# Patient Record
Sex: MALE | Race: ASIAN | NOT HISPANIC OR LATINO | Employment: FULL TIME | URBAN - METROPOLITAN AREA
[De-identification: names, ages, dates, MRNs, and addresses within clinical notes are randomized per-mention and may not be internally consistent; named-entity substitution may affect disease eponyms.]

---

## 2019-06-10 ENCOUNTER — OFFICE VISIT (OUTPATIENT)
Dept: FAMILY MEDICINE | Facility: CLINIC | Age: 29
End: 2019-06-10
Payer: COMMERCIAL

## 2019-06-10 VITALS
OXYGEN SATURATION: 98 % | DIASTOLIC BLOOD PRESSURE: 64 MMHG | BODY MASS INDEX: 26.14 KG/M2 | WEIGHT: 182.6 LBS | TEMPERATURE: 98.5 F | HEART RATE: 52 BPM | SYSTOLIC BLOOD PRESSURE: 112 MMHG | HEIGHT: 70 IN

## 2019-06-10 DIAGNOSIS — Z13.1 SCREENING FOR DIABETES MELLITUS: ICD-10-CM

## 2019-06-10 DIAGNOSIS — Z00.00 ROUTINE PHYSICAL EXAMINATION: Primary | ICD-10-CM

## 2019-06-10 DIAGNOSIS — Z23 NEED FOR VACCINATION: ICD-10-CM

## 2019-06-10 DIAGNOSIS — Z13.220 LIPID SCREENING: ICD-10-CM

## 2019-06-10 PROCEDURE — 99385 PREV VISIT NEW AGE 18-39: CPT | Mod: 25 | Performed by: FAMILY MEDICINE

## 2019-06-10 PROCEDURE — 90471 IMMUNIZATION ADMIN: CPT | Performed by: FAMILY MEDICINE

## 2019-06-10 PROCEDURE — 90715 TDAP VACCINE 7 YRS/> IM: CPT | Performed by: FAMILY MEDICINE

## 2019-06-10 SDOH — HEALTH STABILITY: MENTAL HEALTH: HOW OFTEN DO YOU HAVE 6 OR MORE DRINKS ON ONE OCCASION?: LESS THAN MONTHLY

## 2019-06-10 SDOH — HEALTH STABILITY: MENTAL HEALTH: HOW OFTEN DO YOU HAVE A DRINK CONTAINING ALCOHOL?: MONTHLY OR LESS

## 2019-06-10 SDOH — HEALTH STABILITY: MENTAL HEALTH: HOW MANY STANDARD DRINKS CONTAINING ALCOHOL DO YOU HAVE ON A TYPICAL DAY?: 1 OR 2

## 2019-06-10 ASSESSMENT — MIFFLIN-ST. JEOR: SCORE: 1791.58

## 2019-06-10 NOTE — PROGRESS NOTES
SUBJECTIVE:   CC: Lew Benitez is an 28 year old male who presents for preventative health visit.     Healthy Habits:    Getting at least 3 servings of Calcium per day:  NO    Bi-annual eye exam:  Yes    Dental care twice a year:  Yes    Sleep apnea or symptoms of sleep apnea:  None    Diet:  Regular (no restrictions)    Frequency of exercise:  None    Duration of exercise:  N/A    Taking medications regularly:  Not Applicable    Barriers to taking medications:  Not applicable    Medication side effects:  Not applicable    PHQ-2 Total Score:    Additional concerns today:  No    Today's PHQ-2 Score:   PHQ-2 ( 1999 Pfizer) 6/10/2019   Q1: Little interest or pleasure in doing things 0   Q2: Feeling down, depressed or hopeless 0   PHQ-2 Score 0       Abuse: Current or Past(Physical, Sexual or Emotional)- No  Do you feel safe in your environment? Yes    History reviewed. No pertinent past medical history.    Past Surgical History:   Procedure Laterality Date     APPENDECTOMY         History reviewed. No pertinent family history.    Social History     Tobacco Use     Smoking status: Never Smoker     Smokeless tobacco: Never Used   Substance Use Topics     Alcohol use: Yes     Frequency: Monthly or less     Drinks per session: 1 or 2     Binge frequency: Less than monthly     Comment: once a month     Social History     Tobacco Use     Smoking status: Never Smoker     Smokeless tobacco: Never Used   Substance Use Topics     Alcohol use: Yes     Frequency: Monthly or less     Drinks per session: 1 or 2     Binge frequency: Less than monthly         No flowsheet data found.    Last PSA: No results found for: PSA    Reviewed orders with patient. Reviewed health maintenance and updated orders accordingly - Yes      Reviewed and updated as needed this visit by clinical staff  Tobacco  Allergies  Meds  Med Hx  Surg Hx  Fam Hx  Soc Hx        Reviewed and updated as needed this visit by Provider        1. Establish care    2.  "Physical exam: Otherwise, patient has no complaints.  Patient moved from [place] 2 years ago for work and wife is in undergraduate school.     Review of Systems  CONSTITUTIONAL: NEGATIVE for fever, chills, change in weight  INTEGUMENTARY/SKIN: NEGATIVE for worrisome rashes, moles or lesions  EYES: NEGATIVE for vision changes or irritation  ENT: NEGATIVE for ear, mouth and throat problems  RESP: NEGATIVE for significant cough or SOB  CV: NEGATIVE for chest pain, palpitations or peripheral edema  GI: NEGATIVE for nausea, abdominal pain, heartburn, or change in bowel habits   male: negative for dysuria, hematuria, decreased urinary stream, erectile dysfunction, urethral discharge  MUSCULOSKELETAL: NEGATIVE for significant arthralgias or myalgia  NEURO: NEGATIVE for weakness, dizziness or paresthesias  PSYCHIATRIC: NEGATIVE for changes in mood or affect    OBJECTIVE:   /64 (BP Location: Left arm, Cuff Size: Adult Regular)   Pulse 52   Temp 98.5  F (36.9  C) (Tympanic)   Ht 1.765 m (5' 9.5\")   Wt 82.8 kg (182 lb 9.6 oz)   SpO2 98%   BMI 26.58 kg/m      Physical Exam  GENERAL: healthy, alert and no distress  EYES: Eyes grossly normal to inspection, PERRL and conjunctivae and sclerae normal  HENT: ear canals and TM's normal, nose and mouth without ulcers or lesions  NECK: no adenopathy, no asymmetry, masses, or scars and thyroid normal to palpation  RESP: lungs clear to auscultation - no rales, rhonchi or wheezes  CV: regular rate and rhythm, normal S1 S2, no S3 or S4, no murmur, click or rub, no peripheral edema and peripheral pulses strong  ABDOMEN: soft, nontender, no hepatosplenomegaly, no masses and bowel sounds normal  MS: no gross musculoskeletal defects noted, no edema  SKIN: no suspicious lesions or rashes  NEURO: Normal strength and tone, mentation intact and speech normal  PSYCH: mentation appears normal, affect normal/bright    ASSESSMENT/PLAN:     1. Routine physical examination    2. Need for " "vaccination  - TDAP VACCINE (ADACEL)  - ADMIN 1st VACCINE    3. Lipid screening  - Lipid panel reflex to direct LDL Fasting; Future    4. Screening for diabetes mellitus  - **Glucose FUTURE anytime; Future    COUNSELING:   Reviewed preventive health counseling, as reflected in patient instructions       Regular exercise       Healthy diet/nutrition    Estimated body mass index is 26.58 kg/m  as calculated from the following:    Height as of this encounter: 1.765 m (5' 9.5\").    Weight as of this encounter: 82.8 kg (182 lb 9.6 oz).     Weight management plan: Discussed healthy diet and exercise guidelines     reports that he has never smoked. He has never used smokeless tobacco.      Counseling Resources:  ATP IV Guidelines  Pooled Cohorts Equation Calculator  FRAX Risk Assessment  ICSI Preventive Guidelines  Dietary Guidelines for Americans, 2010  USDA's MyPlate  ASA Prophylaxis  Lung CA Screening    Ed Rodriguez, DO  [facility]    "

## 2019-06-10 NOTE — PATIENT INSTRUCTIONS
Gremohans Lew Benitez,    Thank you for allowing [facility] to manage your care.    Encourage 2 hours per week of cardiovascular activities (cycling, swimming, jogging, elliptical, or treadmill)    I ordered some fasting blood work, please go to the laboratory to get your laboratory studies.  Please call [phone] to scheduled your laboratory appointment.     Please allow 1-2 business days for our office to call you in regards to your laboratory/radiological studies.  If not done so, I encourage you to login into [employer] ([email or web address] to review your results as well.     If you have any questions or concerns, please feel free to call us at [phone].    Sincerely,    Dr. Rodriguez

## 2019-06-11 ENCOUNTER — TELEPHONE (OUTPATIENT)
Dept: FAMILY MEDICINE | Facility: CLINIC | Age: 29
End: 2019-06-11

## 2019-06-11 DIAGNOSIS — Z13.1 SCREENING FOR DIABETES MELLITUS: ICD-10-CM

## 2019-06-11 DIAGNOSIS — R73.03 BORDERLINE DIABETES: ICD-10-CM

## 2019-06-11 DIAGNOSIS — Z13.220 LIPID SCREENING: ICD-10-CM

## 2019-06-11 DIAGNOSIS — E78.1 HYPERTRIGLYCERIDEMIA: Primary | ICD-10-CM

## 2019-06-11 LAB
CHOLEST SERPL-MCNC: 160 MG/DL
GLUCOSE SERPL-MCNC: 151 MG/DL (ref 70–99)
HDLC SERPL-MCNC: 30 MG/DL
LDLC SERPL CALC-MCNC: ABNORMAL MG/DL
LDLC SERPL DIRECT ASSAY-MCNC: 82 MG/DL
NONHDLC SERPL-MCNC: 130 MG/DL
TRIGL SERPL-MCNC: 1776 MG/DL

## 2019-06-11 PROCEDURE — 36415 COLL VENOUS BLD VENIPUNCTURE: CPT | Performed by: FAMILY MEDICINE

## 2019-06-11 PROCEDURE — 80061 LIPID PANEL: CPT | Performed by: FAMILY MEDICINE

## 2019-06-11 PROCEDURE — 83721 ASSAY OF BLOOD LIPOPROTEIN: CPT | Performed by: FAMILY MEDICINE

## 2019-06-11 PROCEDURE — 82947 ASSAY GLUCOSE BLOOD QUANT: CPT | Performed by: FAMILY MEDICINE

## 2019-06-11 RX ORDER — FENOFIBRATE 145 MG/1
145 TABLET, COATED ORAL DAILY
Qty: 90 TABLET | Refills: 3 | Status: SHIPPED | OUTPATIENT
Start: 2019-06-11 | End: 2019-06-24

## 2019-06-11 NOTE — TELEPHONE ENCOUNTER
Recent cholesterol screen is abnormal.  Patient's triglycerides are extremely elevated.  Please confirm that he was fasting.  If yes, rx for fenofibrate  (please go over medication instruction with patient).  Recheck fasting labs in 1 month.  Advise to call [phone] to scheduled your laboratory appointment.     1. Hypertriglyceridemia  - fenofibrate (TRICOR) 145 MG tablet; Take 1 tablet (145 mg) by mouth daily  Dispense: 90 tablet; Refill: 3  - Lipid panel reflex to direct LDL Fasting; Future

## 2019-06-11 NOTE — TELEPHONE ENCOUNTER
Please see my below as well.  Also, his blood sugars are borderline elevated and concerning for diabetes.  Advise to decrease carbohydrate intake (bread, potato, pasta, and sweets) and encourage 2 hours per week of cardiovascular activities (cycling, swimming, jogging, elliptical, or treadmill).  Recheck fasting labs in 1 month.     1. Hypertriglyceridemia  - fenofibrate (TRICOR) 145 MG tablet; Take 1 tablet (145 mg) by mouth daily  Dispense: 90 tablet; Refill: 3  - Lipid panel reflex to direct LDL Fasting; Future    2. Borderline diabetes  - Hemoglobin A1c; Future

## 2019-06-12 ENCOUNTER — MYC MEDICAL ADVICE (OUTPATIENT)
Dept: FAMILY MEDICINE | Facility: CLINIC | Age: 29
End: 2019-06-12

## 2019-06-12 NOTE — TELEPHONE ENCOUNTER
Patient called and labs explained. He said he ate at a buffet the night before the lipid tests and had a lot of sweet things. He said he retested this at work and the triglycerides were much lower. I asked him to fax these results to us or attach them in a My Chart note. He will  the medication and recheck in 1 month.  Paige Santana RN

## 2019-06-17 ENCOUNTER — OFFICE VISIT (OUTPATIENT)
Dept: FAMILY MEDICINE | Facility: CLINIC | Age: 29
End: 2019-06-17
Payer: COMMERCIAL

## 2019-06-17 VITALS
DIASTOLIC BLOOD PRESSURE: 64 MMHG | SYSTOLIC BLOOD PRESSURE: 110 MMHG | BODY MASS INDEX: 26.05 KG/M2 | HEIGHT: 70 IN | HEART RATE: 56 BPM | TEMPERATURE: 98.5 F | OXYGEN SATURATION: 99 % | WEIGHT: 182 LBS | RESPIRATION RATE: 16 BRPM

## 2019-06-17 DIAGNOSIS — R73.09 ELEVATED GLUCOSE: Primary | ICD-10-CM

## 2019-06-17 DIAGNOSIS — H91.93 BILATERAL HEARING LOSS, UNSPECIFIED HEARING LOSS TYPE: ICD-10-CM

## 2019-06-17 PROCEDURE — 99213 OFFICE O/P EST LOW 20 MIN: CPT | Performed by: PHYSICIAN ASSISTANT

## 2019-06-17 ASSESSMENT — MIFFLIN-ST. JEOR: SCORE: 1788.86

## 2019-06-17 NOTE — PATIENT INSTRUCTIONS
Come in fasting (no eating for 9 hours), appointment scheduled today.   Please notify me if you get a bill for the cholesterol panel and we will take that off.  You will get a bill for the HgA1c and the repeat glucose.

## 2019-06-17 NOTE — PROGRESS NOTES
Subjective     Lew Benitez is a 28 year old male who presents to clinic today for the following health issues:    HPI   * Patient requesting ear lavage. Slightly decreased hearing xmonths. Denies: pain or drainage from ears.  He has been evaluated for hearing loss in the past and found to have decreased hearing due to noise injury.  He was told to wear protective headphones if in loud areas.  He states he mainly has difficulty with high pitched noises, no issues with conversation or day to day speech.     *Discuss lab results- see notes from Dr. Rodriguez- needs labs repeated.  He states he ate at a buffet the night before.  He had testing done through work for a biophysical visit and handwritten numbers were much better compared to the lab results we just recently got.      Paz Walker [facility]        Patient Active Problem List   Diagnosis     Hypertriglyceridemia     Elevated glucose     Bilateral hearing loss, unspecified hearing loss type     Past Surgical History:   Procedure Laterality Date     APPENDECTOMY         Social History     Tobacco Use     Smoking status: Never Smoker     Smokeless tobacco: Never Used   Substance Use Topics     Alcohol use: Yes     Frequency: Monthly or less     Drinks per session: 1 or 2     Binge frequency: Less than monthly     Comment: once a month     History reviewed. No pertinent family history.      Current Outpatient Medications   Medication Sig Dispense Refill     fenofibrate (TRICOR) 145 MG tablet Take 1 tablet (145 mg) by mouth daily 90 tablet 3     BP Readings from Last 3 Encounters:   06/17/19 110/64   06/10/19 112/64    Wt Readings from Last 3 Encounters:   06/17/19 82.6 kg (182 lb)   06/10/19 82.8 kg (182 lb 9.6 oz)                      Reviewed and updated as needed this visit by Provider  Tobacco  Allergies  Meds  Problems  Med Hx  Surg Hx  Fam Hx         Review of Systems   ROS COMP: Constitutional, HEENT, cardiovascular, pulmonary, gi and gu systems are negative,  "except as otherwise noted.      Objective    /64   Pulse 56   Temp 98.5  F (36.9  C) (Tympanic)   Resp 16   Ht 1.765 m (5' 9.5\")   Wt 82.6 kg (182 lb)   SpO2 99%   BMI 26.49 kg/m    Body mass index is 26.49 kg/m .  Physical Exam   GENERAL: healthy, alert and no distress  HENT: ear canals and TM's normal, nose and mouth without ulcers or lesions    Diagnostic Test Results:  Labs reviewed in Epic  Results for orders placed or performed in visit on 06/11/19   **Glucose FUTURE anytime   Result Value Ref Range    Glucose 151 (H) 70 - 99 mg/dL   Lipid panel reflex to direct LDL Fasting   Result Value Ref Range    Cholesterol 160 <200 mg/dL    Triglycerides 1,776 (H) <150 mg/dL    HDL Cholesterol 30 (L) >39 mg/dL    LDL Cholesterol Calculated  <100 mg/dL     Cannot estimate LDL when triglyceride exceeds 400 mg/dL    Non HDL Cholesterol 130 (H) <130 mg/dL   LDL cholesterol direct   Result Value Ref Range    LDL Cholesterol Direct 82 <100 mg/dL           Assessment & Plan     1. Elevated glucose  Reviewed biometric forms and outside labs that were done (which were hand written in so I cannot use those to complete current forms).  Triglycerides are extremely elevated and I question validity of this test.  Will repeat fasting lipid panel in addition to diabetes work-up (repeat fasting glucose and HgA1C).  He does have a family history of diabetes and was told his sugar was high in the past.   - Glucose; Future    Bilateral hearing loss.  Has been diagnosed in the past.  No ear wax noted during exam today.  I offered referral to audiology, however he states he has already been through that work up at another clinic.      BMI:   Estimated body mass index is 26.49 kg/m  as calculated from the following:    Height as of this encounter: 1.765 m (5' 9.5\").    Weight as of this encounter: 82.6 kg (182 lb).         Please notify me if you get a bill for the cholesterol panel and we will take that off.  You will get a bill " for the HgA1c and the repeat glucose.        Return in about 1 week (around 6/24/2019) for fasting labs.    Estrellita Johnson PA-C  [facility]

## 2019-06-20 PROBLEM — H91.93 BILATERAL HEARING LOSS, UNSPECIFIED HEARING LOSS TYPE: Status: ACTIVE | Noted: 2019-06-20

## 2019-06-20 PROBLEM — R73.09 ELEVATED GLUCOSE: Status: ACTIVE | Noted: 2019-06-20

## 2019-06-24 DIAGNOSIS — R73.09 ELEVATED GLUCOSE: ICD-10-CM

## 2019-06-24 DIAGNOSIS — E78.1 HYPERTRIGLYCERIDEMIA: ICD-10-CM

## 2019-06-24 DIAGNOSIS — R73.03 BORDERLINE DIABETES: ICD-10-CM

## 2019-06-24 LAB
CHOLEST SERPL-MCNC: 154 MG/DL
GLUCOSE SERPL-MCNC: 110 MG/DL (ref 70–99)
HBA1C MFR BLD: 5.6 % (ref 0–5.6)
HDLC SERPL-MCNC: 35 MG/DL
LDLC SERPL CALC-MCNC: 47 MG/DL
NONHDLC SERPL-MCNC: 119 MG/DL
TRIGL SERPL-MCNC: 359 MG/DL

## 2019-06-24 PROCEDURE — 82947 ASSAY GLUCOSE BLOOD QUANT: CPT | Performed by: PHYSICIAN ASSISTANT

## 2019-06-24 PROCEDURE — 83036 HEMOGLOBIN GLYCOSYLATED A1C: CPT | Performed by: PHYSICIAN ASSISTANT

## 2020-03-11 ENCOUNTER — HEALTH MAINTENANCE LETTER (OUTPATIENT)
Age: 30
End: 2020-03-11

## 2021-01-03 ENCOUNTER — HEALTH MAINTENANCE LETTER (OUTPATIENT)
Age: 31
End: 2021-01-03

## 2021-10-10 ENCOUNTER — HEALTH MAINTENANCE LETTER (OUTPATIENT)
Age: 31
End: 2021-10-10

## 2021-12-08 ENCOUNTER — TRANSFERRED RECORDS (OUTPATIENT)
Dept: PHYSICAL THERAPY | Facility: CLINIC | Age: 31
End: 2021-12-08
Payer: COMMERCIAL

## 2022-01-30 ENCOUNTER — HEALTH MAINTENANCE LETTER (OUTPATIENT)
Age: 32
End: 2022-01-30

## 2022-09-18 ENCOUNTER — HEALTH MAINTENANCE LETTER (OUTPATIENT)
Age: 32
End: 2022-09-18

## 2023-05-07 ENCOUNTER — HEALTH MAINTENANCE LETTER (OUTPATIENT)
Age: 33
End: 2023-05-07

## 2023-09-20 ASSESSMENT — ENCOUNTER SYMPTOMS
SHORTNESS OF BREATH: 1
CONSTIPATION: 0
DYSURIA: 0
HEMATOCHEZIA: 0
FEVER: 0
HEADACHES: 0
ABDOMINAL PAIN: 0
EYE PAIN: 0
WEAKNESS: 1
JOINT SWELLING: 1
NERVOUS/ANXIOUS: 0
SORE THROAT: 0
CHILLS: 0
DIARRHEA: 0
ARTHRALGIAS: 1
PARESTHESIAS: 0
DIZZINESS: 1
HEMATURIA: 0
PALPITATIONS: 0
FREQUENCY: 0
MYALGIAS: 0
NAUSEA: 0
COUGH: 0
HEARTBURN: 0

## 2023-09-27 ENCOUNTER — ANCILLARY PROCEDURE (OUTPATIENT)
Dept: GENERAL RADIOLOGY | Facility: CLINIC | Age: 33
End: 2023-09-27
Attending: INTERNAL MEDICINE
Payer: COMMERCIAL

## 2023-09-27 ENCOUNTER — OFFICE VISIT (OUTPATIENT)
Dept: FAMILY MEDICINE | Facility: CLINIC | Age: 33
End: 2023-09-27
Payer: COMMERCIAL

## 2023-09-27 VITALS
TEMPERATURE: 97.9 F | DIASTOLIC BLOOD PRESSURE: 74 MMHG | BODY MASS INDEX: 26.37 KG/M2 | SYSTOLIC BLOOD PRESSURE: 113 MMHG | HEART RATE: 73 BPM | OXYGEN SATURATION: 98 % | HEIGHT: 70 IN | WEIGHT: 184.2 LBS | RESPIRATION RATE: 14 BRPM

## 2023-09-27 DIAGNOSIS — Z01.83 BLOOD TYPING ENCOUNTER: ICD-10-CM

## 2023-09-27 DIAGNOSIS — Z00.00 ROUTINE GENERAL MEDICAL EXAMINATION AT A HEALTH CARE FACILITY: Primary | ICD-10-CM

## 2023-09-27 DIAGNOSIS — Z13.228 SCREENING FOR METABOLIC DISORDER: ICD-10-CM

## 2023-09-27 DIAGNOSIS — R73.03 PREDIABETES: ICD-10-CM

## 2023-09-27 DIAGNOSIS — H90.3 BILATERAL SENSORINEURAL HEARING LOSS: ICD-10-CM

## 2023-09-27 DIAGNOSIS — M79.644 PAIN OF FINGER OF RIGHT HAND: ICD-10-CM

## 2023-09-27 DIAGNOSIS — Z13.0 SCREENING FOR BLOOD DISEASE: ICD-10-CM

## 2023-09-27 DIAGNOSIS — Z13.29 SCREENING FOR ENDOCRINE DISORDER: ICD-10-CM

## 2023-09-27 DIAGNOSIS — Z11.4 SCREENING FOR HIV (HUMAN IMMUNODEFICIENCY VIRUS): ICD-10-CM

## 2023-09-27 DIAGNOSIS — Z11.1 SCREENING EXAMINATION FOR PULMONARY TUBERCULOSIS: ICD-10-CM

## 2023-09-27 DIAGNOSIS — E78.1 HYPERTRIGLYCERIDEMIA: ICD-10-CM

## 2023-09-27 DIAGNOSIS — M53.3 SACROILIAC PAIN: ICD-10-CM

## 2023-09-27 DIAGNOSIS — Z11.59 NEED FOR HEPATITIS C SCREENING TEST: ICD-10-CM

## 2023-09-27 LAB
ABO/RH(D): NORMAL
ALBUMIN SERPL BCG-MCNC: 4.7 G/DL (ref 3.5–5.2)
ALP SERPL-CCNC: 68 U/L (ref 40–129)
ALT SERPL W P-5'-P-CCNC: 14 U/L (ref 0–70)
ANION GAP SERPL CALCULATED.3IONS-SCNC: 12 MMOL/L (ref 7–15)
AST SERPL W P-5'-P-CCNC: 20 U/L (ref 0–45)
BILIRUB SERPL-MCNC: 0.2 MG/DL
BUN SERPL-MCNC: 21.9 MG/DL (ref 6–20)
CALCIUM SERPL-MCNC: 9.4 MG/DL (ref 8.6–10)
CHLORIDE SERPL-SCNC: 104 MMOL/L (ref 98–107)
CHOLEST SERPL-MCNC: 185 MG/DL
CREAT SERPL-MCNC: 1 MG/DL (ref 0.67–1.17)
DEPRECATED HCO3 PLAS-SCNC: 24 MMOL/L (ref 22–29)
EGFRCR SERPLBLD CKD-EPI 2021: >90 ML/MIN/1.73M2
ERYTHROCYTE [DISTWIDTH] IN BLOOD BY AUTOMATED COUNT: 11.9 % (ref 10–15)
GLUCOSE SERPL-MCNC: 107 MG/DL (ref 70–99)
HCT VFR BLD AUTO: 45.2 % (ref 40–53)
HDLC SERPL-MCNC: 48 MG/DL
HGB BLD-MCNC: 14.9 G/DL (ref 13.3–17.7)
LDLC SERPL CALC-MCNC: 111 MG/DL
MCH RBC QN AUTO: 27.1 PG (ref 26.5–33)
MCHC RBC AUTO-ENTMCNC: 33 G/DL (ref 31.5–36.5)
MCV RBC AUTO: 82 FL (ref 78–100)
NONHDLC SERPL-MCNC: 137 MG/DL
PLATELET # BLD AUTO: 160 10E3/UL (ref 150–450)
POTASSIUM SERPL-SCNC: 4 MMOL/L (ref 3.4–5.3)
PROT SERPL-MCNC: 7.2 G/DL (ref 6.4–8.3)
RBC # BLD AUTO: 5.49 10E6/UL (ref 4.4–5.9)
SODIUM SERPL-SCNC: 140 MMOL/L (ref 135–145)
SPECIMEN EXPIRATION DATE: NORMAL
TRIGL SERPL-MCNC: 132 MG/DL
TSH SERPL DL<=0.005 MIU/L-ACNC: 3.75 UIU/ML (ref 0.3–4.2)
WBC # BLD AUTO: 5.7 10E3/UL (ref 4–11)

## 2023-09-27 PROCEDURE — 99214 OFFICE O/P EST MOD 30 MIN: CPT | Mod: 25 | Performed by: INTERNAL MEDICINE

## 2023-09-27 PROCEDURE — 86803 HEPATITIS C AB TEST: CPT | Performed by: INTERNAL MEDICINE

## 2023-09-27 PROCEDURE — 73140 X-RAY EXAM OF FINGER(S): CPT | Mod: TC | Performed by: RADIOLOGY

## 2023-09-27 PROCEDURE — 87389 HIV-1 AG W/HIV-1&-2 AB AG IA: CPT | Performed by: INTERNAL MEDICINE

## 2023-09-27 PROCEDURE — 99385 PREV VISIT NEW AGE 18-39: CPT | Performed by: INTERNAL MEDICINE

## 2023-09-27 PROCEDURE — 84443 ASSAY THYROID STIM HORMONE: CPT | Performed by: INTERNAL MEDICINE

## 2023-09-27 PROCEDURE — 80061 LIPID PANEL: CPT | Performed by: INTERNAL MEDICINE

## 2023-09-27 PROCEDURE — 72220 X-RAY EXAM SACRUM TAILBONE: CPT | Mod: TC | Performed by: STUDENT IN AN ORGANIZED HEALTH CARE EDUCATION/TRAINING PROGRAM

## 2023-09-27 PROCEDURE — 86901 BLOOD TYPING SEROLOGIC RH(D): CPT | Performed by: INTERNAL MEDICINE

## 2023-09-27 PROCEDURE — 36415 COLL VENOUS BLD VENIPUNCTURE: CPT | Performed by: INTERNAL MEDICINE

## 2023-09-27 PROCEDURE — 86900 BLOOD TYPING SEROLOGIC ABO: CPT | Performed by: INTERNAL MEDICINE

## 2023-09-27 PROCEDURE — 86481 TB AG RESPONSE T-CELL SUSP: CPT | Performed by: INTERNAL MEDICINE

## 2023-09-27 PROCEDURE — 85027 COMPLETE CBC AUTOMATED: CPT | Performed by: INTERNAL MEDICINE

## 2023-09-27 PROCEDURE — 80053 COMPREHEN METABOLIC PANEL: CPT | Performed by: INTERNAL MEDICINE

## 2023-09-27 ASSESSMENT — ENCOUNTER SYMPTOMS
HEMATOCHEZIA: 0
EYE PAIN: 0
PALPITATIONS: 0
PARESTHESIAS: 0
FREQUENCY: 0
NERVOUS/ANXIOUS: 0
SORE THROAT: 0
ARTHRALGIAS: 1
HEADACHES: 0
HEMATURIA: 0
CHILLS: 0
COUGH: 0
WEAKNESS: 1
FEVER: 0
DIZZINESS: 1
DYSURIA: 0
NAUSEA: 0
JOINT SWELLING: 1
HEARTBURN: 0
MYALGIAS: 0
ABDOMINAL PAIN: 0
DIARRHEA: 0
CONSTIPATION: 0
SHORTNESS OF BREATH: 0

## 2023-09-27 ASSESSMENT — PAIN SCALES - GENERAL: PAINLEVEL: MILD PAIN (3)

## 2023-09-27 NOTE — PROGRESS NOTES
Tb goSUBJECTIVE:   CC: Lew is an 32 year old who presents for preventative health visit.       9/27/2023     7:49 AM   Additional Questions   Roomed by Livier   Accompanied by self         9/27/2023     7:49 AM   Patient Reported Additional Medications   Patient reports taking the following new medications none         New patient to our clinic.   History of mild to moderate hearing loss, cause unknown. Audiology 3-4 years ago. Found accidentally through a free hearing test.     Back pain for about 2 years. Initially he jumped off a rock about 4 feet high. When he landed felt the low back pain. Since then, has had almost daily back pain. He can always feel vague discomfort but frequently gets worse with activities, prolonged sitting, standing. Had xray through work health clinic in April 2022. It was negative. He was referred to PT but he didn't go.    Right index finger has pain at the PIP for a year. Injured it while walking the dog a year ago when the dog ran ahead and pulled on the leash.     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   Pt wanted to have complete blood count, thyroid test, blood type, liver enzymes checked. He is aware they are not standard preventive screening. Cost is not a concern to him. He and his wife are starting planning to have kids.     Healthy Habits:     Getting at least 3 servings of Calcium per day:  Yes    Bi-annual eye exam:  Yes    Dental care twice a year:  Yes    Sleep apnea or symptoms of sleep apnea:  Daytime drowsiness, Excessive snoring and Sleep apnea    Diet:  Regular (no restrictions)    Frequency of exercise:  2-3 days/week    Duration of exercise:  Less than 15 minutes    Taking medications regularly:  Yes    Medication side effects:  Not applicable    Additional concerns today:  No      Today's PHQ-2 Score:       9/26/2023     9:36 AM   PHQ-2 ( 1999 Pfizer)   Q1: Little interest or pleasure in doing things 0   Q2: Feeling down, depressed or  hopeless 0   PHQ-2 Score 0   Q1: Little interest or pleasure in doing things Not at all   Q2: Feeling down, depressed or hopeless Not at all   PHQ-2 Score 0       Have you ever done Advance Care Planning? (For example, a Health Directive, POLST, or a discussion with a medical provider or your loved ones about your wishes): Yes, advance care planning is on file.    Social History     Tobacco Use    Smoking status: Never    Smokeless tobacco: Never   Substance Use Topics    Alcohol use: Yes     Comment: once a month             9/20/2023     2:17 PM   Alcohol Use   Prescreen: >3 drinks/day or >7 drinks/week? No       Last PSA: No results found for: PSA    Reviewed orders with patient. Reviewed health maintenance and updated orders accordingly - Yes      Reviewed and updated as needed this visit by clinical staff   Tobacco  Allergies  Meds     [facility]          Reviewed and updated as needed this visit by Provider         [facility]             Review of Systems   Constitutional:  Negative for chills and fever.   HENT:  Positive for hearing loss. Negative for congestion, ear pain and sore throat.    Eyes:  Negative for pain and visual disturbance.   Respiratory:  Negative for cough and shortness of breath.    Cardiovascular:  Negative for chest pain, palpitations and peripheral edema.   Gastrointestinal:  Negative for abdominal pain, constipation, diarrhea, heartburn, hematochezia and nausea.   Genitourinary:  Negative for dysuria, frequency, genital sores, hematuria, impotence, penile discharge and urgency.   Musculoskeletal:  Positive for arthralgias and joint swelling. Negative for myalgias.        Back pain for 2 years. Worse with physical activity such as mowing the lawn, moving things, prolonged walking, standing or sitting   Skin:  Negative for rash.   Neurological:  Positive for dizziness (only when he stands up quickly from a squatting position. not at other times.) and weakness (back pain with physical  "activity such as mowing the law). Negative for headaches and paresthesias.   Psychiatric/Behavioral:  Negative for mood changes. The patient is not nervous/anxious.          OBJECTIVE:   /74 (BP Location: Right arm, Patient Position: Sitting, Cuff Size: Adult Regular)   Pulse 73   Temp 97.9  F (36.6  C) (Oral)   Resp 14   Ht 1.77 m (5' 9.69\")   Wt 83.6 kg (184 lb 3.2 oz)   SpO2 98%   BMI 26.67 kg/m      Physical Exam  GENERAL: healthy, alert and no distress  EYES: Eyes grossly normal to inspection, PERRL and conjunctivae and sclerae normal  HENT: ear canals and TM's normal, nose and mouth without ulcers or lesions  NECK: no adenopathy, no asymmetry, masses, or scars and thyroid normal to palpation  RESP: lungs clear to auscultation - no rales, rhonchi or wheezes  CV: regular rate and rhythm, normal S1 S2, no S3 or S4, no murmur, click or rub, no peripheral edema and peripheral pulses strong  ABDOMEN: soft, nontender, no hepatosplenomegaly, no masses and bowel sounds normal  MS: no gross musculoskeletal defects noted, no edema  SKIN: no suspicious lesions or rashes  NEURO: Normal strength and tone, mentation intact and speech normal  PSYCH: mentation appears normal, affect normal/bright    Right hand: tender at the PIP of the index finger. No significant bruising or swelling  Back: no lumbar spine tenderness. ROM normal. Pt pointed to SI joints bilaterally as the area of pain but currently nontender on palpation.     Diagnostic Test Results:  Results for orders placed or performed in visit on 09/27/23 (from the past 24 hour(s))   Quantiferon TB Gold Plus    Specimen: Peripheral Blood    Narrative    The following orders were created for panel order Quantiferon TB Gold Plus.  Procedure                               Abnormality         Status                     ---------                               -----------         ------                     Quantiferon TB Gold Plus...[[id number]]                      " In process                 Quantiferon TB Gold Plus...[[id number]]                      In process                 Quantiferon TB Gold Plus...[[id number]]                      In process                 Quantiferon TB Gold Plus...[[id number]]                      In process                   Please view results for these tests on the individual orders.   CBC with platelets   Result Value Ref Range    WBC Count 5.7 4.0 - 11.0 10e3/uL    RBC Count 5.49 4.40 - 5.90 10e6/uL    Hemoglobin 14.9 13.3 - 17.7 g/dL    Hematocrit 45.2 40.0 - 53.0 %    MCV 82 78 - 100 fL    MCH 27.1 26.5 - 33.0 pg    MCHC 33.0 31.5 - 36.5 g/dL    RDW 11.9 10.0 - 15.0 %    Platelet Count 160 150 - 450 10e3/uL       ASSESSMENT/PLAN:   Lew was seen today for physical.    Diagnoses and all orders for this visit:    Routine general medical examination at a health care facility  -     REVIEW OF HEALTH MAINTENANCE PROTOCOL ORDERS  -     HIV Antigen Antibody Combo; Future  -     Hepatitis C Screen Reflex to HCV RNA Quant and Genotype; Future    Screening for HIV (human immunodeficiency virus)  -     HIV Antigen Antibody Combo; Future  -     HIV Antigen Antibody Combo    Need for hepatitis C screening test  -     Hepatitis C Screen Reflex to HCV RNA Quant and Genotype; Future  -     Hepatitis C Screen Reflex to HCV RNA Quant and Genotype    Bilateral sensorineural hearing loss  -     Adult ENT  Referral; Future    Hypertriglyceridemia  -     Lipid panel reflex to direct LDL Fasting; Future  -     Lipid panel reflex to direct LDL Fasting    Prediabetes  -     Cancel: Glucose; Future    Screening for endocrine disorder  -     TSH with free T4 reflex; Future  -     TSH with free T4 reflex    Sacroiliac pain  -     XR Sacrum and Coccyx 2 Views; Future    Blood typing encounter  -     ABO and Rh; Future  -     ABO and Rh    Pain of finger of right hand  -     XR Finger Right G/E 2 Views; Future    Screening for metabolic disorder  -     Cancel:  Hepatic panel (Albumin, ALT, AST, Bili, Alk Phos, TP); Future  -     Comprehensive metabolic panel (BMP + Alb, Alk Phos, ALT, AST, Total. Bili, TP); Future  -     Comprehensive metabolic panel (BMP + Alb, Alk Phos, ALT, AST, Total. Bili, TP)    Screening for blood disease  -     CBC with platelets; Future  -     CBC with platelets    Screening examination for pulmonary tuberculosis  -     Quantiferon TB Gold Plus; Future  -     Quantiferon TB Gold Plus      Pt reported having had hepatitis B vaccine in [place].   Declined vaccines today.       COUNSELING:   Reviewed preventive health counseling, as reflected in patient instructions    He reports that he has never smoked. He has never used smokeless tobacco.        Bret Saldivar MD PhD  [facility]

## 2023-09-28 LAB
GAMMA INTERFERON BACKGROUND BLD IA-ACNC: 0.12 IU/ML
HCV AB SERPL QL IA: NONREACTIVE
HIV 1+2 AB+HIV1 P24 AG SERPL QL IA: NONREACTIVE
M TB IFN-G BLD-IMP: NEGATIVE
M TB IFN-G CD4+ BCKGRND COR BLD-ACNC: 9.88 IU/ML
MITOGEN IGNF BCKGRD COR BLD-ACNC: -0.02 IU/ML
MITOGEN IGNF BCKGRD COR BLD-ACNC: 0.01 IU/ML
QUANTIFERON MITOGEN: 10 IU/ML
QUANTIFERON NIL TUBE: 0.12 IU/ML
QUANTIFERON TB1 TUBE: 0.13 IU/ML
QUANTIFERON TB2 TUBE: 0.1

## 2023-09-29 ENCOUNTER — MYC MEDICAL ADVICE (OUTPATIENT)
Dept: FAMILY MEDICINE | Facility: CLINIC | Age: 33
End: 2023-09-29
Payer: COMMERCIAL

## 2023-09-29 DIAGNOSIS — M79.644 PAIN OF FINGER OF RIGHT HAND: Primary | ICD-10-CM

## 2023-10-09 ENCOUNTER — OFFICE VISIT (OUTPATIENT)
Dept: PHYSICAL MEDICINE AND REHAB | Facility: CLINIC | Age: 33
End: 2023-10-09
Payer: COMMERCIAL

## 2023-10-09 VITALS
DIASTOLIC BLOOD PRESSURE: 69 MMHG | BODY MASS INDEX: 27.36 KG/M2 | WEIGHT: 189 LBS | HEART RATE: 73 BPM | SYSTOLIC BLOOD PRESSURE: 104 MMHG

## 2023-10-09 DIAGNOSIS — M54.50 CHRONIC BILATERAL LOW BACK PAIN WITHOUT SCIATICA: Primary | ICD-10-CM

## 2023-10-09 DIAGNOSIS — G89.29 CHRONIC BILATERAL LOW BACK PAIN WITHOUT SCIATICA: Primary | ICD-10-CM

## 2023-10-09 DIAGNOSIS — M62.838 MUSCLE SPASM: ICD-10-CM

## 2023-10-09 PROCEDURE — 99204 OFFICE O/P NEW MOD 45 MIN: CPT | Performed by: NURSE PRACTITIONER

## 2023-10-09 NOTE — PATIENT INSTRUCTIONS
It was a pleasure seeing you in the clinic today.  As discussed, we made the following updates to your plan of care:       An order for physical therapy was added today. Physical therapy schedulers should call you in the next few days to schedule an appointment. You may also call [phone] to arrange an appointment at any of the [facility] outpatient physical therapy locations.  It will be very important for you to do the physical therapy exercises on a regular basis to decrease your pain and prevent future flares of pain.     Let's follow up after 6 weeks of physical therapy and home exercise program to see how you are doing and talk about next steps.     Thank you,  Ruthie Moctezuma NP  Physical Medicine and Rehabilitation, Medical Spine  PM&R clinic Phone: [phone]  PM[facility]R clinic Fax: [phone]

## 2023-10-09 NOTE — TELEPHONE ENCOUNTER
FUTURE VISIT INFORMATION      FUTURE VISIT INFORMATION:  Date: 12.6.23  Time: 8:20AM  Location: [facility]  REFERRAL INFORMATION:  Referring provider:  Bret Saldivar MD PhD   Referring providers clinic:  [facility]   Reason for visit/diagnosis  per patient hearing loss     RECORDS REQUESTED FROM:       Clinic name Comments Records Status Imaging Status   [facility] 9/27/23- OV . Bret Saldivar MD PhD  [facility]

## 2023-10-09 NOTE — PROGRESS NOTES
Patient seen at the request of No ref. provider found for an opinion and evaluation of low back pain.      HISTORY OF PRESENT ILLNESS:  Lew Benitez is a 32 year old male who presents with a chief complaint of chronic low back pain.     He was seen today in the clinic.  He reports chronic low back pain that started about 2 years ago.  He was traveling and took a photo, then jumped off a rock about 4 feet high.  When he landed, he had low back pain and had difficulty bending.  As he traveled back home, the pain somewhat improved.    However, since that time he has experienced persistent, intermittent, localized, nonradiating, bilateral low back pain most days. Certain positions seem to aggravate the pain.  In particular, lumbar flexion bothers him.  For example, if he is bending forward to wash his face for a few minutes, his low back starts to hurt.  Sitting, lifting, or prolonged walking also bring on the pain.  He says the pain affects his ability to stand up straight.  Generally, the pain happens transiently / with certain positions.  He did have a flare of pain last year which lasted a full day, that started after he woke up and just turned off his alarm.      He cannot identify any particular relieving factors.  He does not take any medication for pain.  Currently, while sitting he is not having any pain. His sleep has not been affected.    He was seen through a work health clinic previously. Imaging has included x-ray of the lumbar spine and sacrum/coccyx which were unremarkable.      He denies falls, weakness, bowel or bladder incontinence, saddle anesthesia, unintentional weight loss, fevers/chills, or night sweats.       PRIOR INJURIES/TREATMENT:   Ice/Heat: none    Brace: none    Physical Therapy:   none     - Current Pain Medications -   no    - Prior/Trialed Pain Medications -   none    Prior Procedures:  Date    Procedure   Improvement (%)  none              Prior Related Surgery: none   Other  (acupuncture, OMT, CMM, TENS, DME, etc.): none    Specialists Seen - (with most recent, available notes and clinic visits reviewed)   1. none       IMAGING - reviewed   XR SACRUM AND COCCYX 2 VIEWS 9/27/2023   IMPRESSION:    1.  No acute fracture or malalignment.  2.  No significant degenerative changes or erosive change involving  the sacroiliac joints.      PLAIN RADIOGRAPHS LUMBAR SPINE 2 VIEWS 3/3/2022  INTERPRETATION: There are 5 nonrib-bearing lumbar-type vertebral bodies. Normal lordotic curvature. No loss of vertebral body height. No loss of disc space height. No spondylolisthesis.    CONCLUSION: Unremarkable plain radiographs of the lumbar spine.        Review Of Systems:  I am responding to those symptoms which are directly relevant to the specific indication for my consultation. I recommend that the patient follow up with their primary or referring provider to pursue any other symptoms which may be of concern.       Medical History:  He has no past medical history on file.     He has a past surgical history that includes appendectomy.      Family History  His family history includes Diabetes in his maternal grandfather; No Known Problems in his father and mother; Prostate Cancer (age of onset: 70 - 79) in his maternal grandfather.     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 Performs ADLs/IADLs independently.      He  reports that he has never smoked. He has never used smokeless tobacco. He reports current alcohol use. He reports that he does not use drugs.        Current Medications:   He currently has no medications in their medication list.     Allergies:   No Known Allergies    PHYSICAL EXAMINATION:  /69   Pulse 73   Wt 85.7 kg (189 lb)   BMI 27.36 kg/m     --CONSTITUTIONAL: Vital signs as above. No acute distress. The patient is well nourished and well groomed.  --PSYCHIATRIC: The patient is awake,  alert, oriented to person, place, time and answering questions appropriately with clear speech. Appropriate mood and affect   --SKIN:  Posterior torso is clean, dry, intact without rashes.   --RESPIRATORY: Normal rhythm and effort. No abnormal accessory muscle breathing patterns noted.   --GROSS MOTOR: Easily arises from a seated position. Toe walking and heel walking are normal.    --STANDING EXAMINATION: Gait is non-antalgic. Normal lumbar lordosis noted, no lateral shift.  --LOWER EXTREMITY MOTOR TESTING:  Plantar flexion left 5/5, right 5/5   Dorsiflexion left 5/5, right 5/5   Great toe MTP extension left 5/5, right 5/5  Knee flexion left 5/5, right 5/5  Knee extension left 5/5, right 5/5   Hip flexion left 5/5, right 5/5  --MUSCULOSKELETAL: Lumbar spine inspection reveals no evidence of deformity. Range of motion is not limited in lumbar flexion, extension, lateral rotation.  Lumbar flexion does not elicit pain on exam no tenderness to palpation lumbar spine. Straight leg raise negative bilaterally. Sciatic notch non-tender. Facet loading maneuvers are positive on the left side, and negative on the right side.  Muscle fullness lumbar paraspinals. Localized pain around L5, S1 on the right side elicits mild pain  --SACROILIAC JOINT: Olvin positive on the right side.   Seng's negative bilaterally. Gaenslen's negative bilaterally.  Sacroiliac Joint Compression Test negative bilaterally.   --HIPS: Full range of motion bilaterally. Negative FABERs on the involved lower extremity. No pain with logrolling. No pain with palpation of the greater trochanter.   --NEUROLOGIC: 2/4 patellar and achilles reflexes bilaterally.  Sensation to light touch is intact in the bilateral L4, L5, and S1 dermatomes.  No clonus.    --VASCULAR:  Warm lower limbs bilaterally. There is no edema of the bilateral lower extremities.       ASSESSMENT:  Lew Benitez is a pleasant 32 year old male who presents with chronic, intermittent, bilateral,  nonradiating low back pain which started after jumping off a rock 2 years ago.  This pain is mostly positional, aggravated with lumbar flexion in particular.  Suspect pain may be myofascial, possibly with a component of facet mediated pain.      PLAN:  -X-ray x-ray of the lumbar spine 3/3/2022 and x-ray of the sacrum/coccyx 9/27/2023 were unremarkable.  Discussed possibly updating the x-ray of the lumbar spine, however, given that his symptoms are unchanged, he prefers to hold off for now.  -Add referral for physical therapy.  We discussed the importance of a home exercise program.  -RTC after 6 weeks of PT and home exercise program.  If his symptoms are unchanged at that time, will add MRI of the lumbar spine.    Ready to learn, no apparent learning barriers.  Education provided on treatment plan according to patient's preferred learning style.  Patient verbalizes understanding.   __________________________________  Ruthie Moctezuma NP  Physical Medicine & Rehabilitation        45 minutes spent by me on the date of the encounter doing chart review, history and exam, documentation and further activities per the note

## 2023-10-09 NOTE — LETTER
10/9/2023       RE: Lew Benitez  [address] [place] [address]     Dear Colleague,    Thank you for referring your patient, Lew Benitez, to the [facility] [place] at [facility]. Please see a copy of my visit note below.    Patient seen at the request of No ref. provider found for an opinion and evaluation of low back pain.      HISTORY OF PRESENT ILLNESS:  Lew Benitez is a 32 year old male who presents with a chief complaint of chronic low back pain.     He was seen today in the clinic.  He reports chronic low back pain that started about 2 years ago.  He was traveling and took a photo, then jumped off a rock about 4 feet high.  When he landed, he had low back pain and had difficulty bending.  As he traveled back home, the pain somewhat improved.    However, since that time he has experienced persistent, intermittent, localized, nonradiating, bilateral low back pain most days. Certain positions seem to aggravate the pain.  In particular, lumbar flexion bothers him.  For example, if he is bending forward to wash his face for a few minutes, his low back starts to hurt.  Sitting, lifting, or prolonged walking also bring on the pain.  He says the pain affects his ability to stand up straight.  Generally, the pain happens transiently / with certain positions.  He did have a flare of pain last year which lasted a full day, that started after he woke up and just turned off his alarm.      He cannot identify any particular relieving factors.  He does not take any medication for pain.  Currently, while sitting he is not having any pain. His sleep has not been affected.    He was seen through a work health clinic previously. Imaging has included x-ray of the lumbar spine and sacrum/coccyx which were unremarkable.      He denies falls, weakness, bowel or bladder incontinence, saddle anesthesia, unintentional weight  loss, fevers/chills, or night sweats.       PRIOR INJURIES/TREATMENT:   Ice/Heat: none    Brace: none    Physical Therapy:   none     - Current Pain Medications -   no    - Prior/Trialed Pain Medications -   none    Prior Procedures:  Date    Procedure   Improvement (%)  none              Prior Related Surgery: none   Other (acupuncture, OMT, CMM, TENS, DME, etc.): none    Specialists Seen - (with most recent, available notes and clinic visits reviewed)   1. none       IMAGING - reviewed   XR SACRUM AND COCCYX 2 VIEWS 9/27/2023   IMPRESSION:    1.  No acute fracture or malalignment.  2.  No significant degenerative changes or erosive change involving  the sacroiliac joints.      PLAIN RADIOGRAPHS LUMBAR SPINE 2 VIEWS 3/3/2022  INTERPRETATION: There are 5 nonrib-bearing lumbar-type vertebral bodies. Normal lordotic curvature. No loss of vertebral body height. No loss of disc space height. No spondylolisthesis.    CONCLUSION: Unremarkable plain radiographs of the lumbar spine.        Review Of Systems:  I am responding to those symptoms which are directly relevant to the specific indication for my consultation. I recommend that the patient follow up with their primary or referring provider to pursue any other symptoms which may be of concern.       Medical History:  He has no past medical history on file.     He has a past surgical history that includes appendectomy.      Family History  His family history includes Diabetes in his maternal grandfather; No Known Problems in his father and mother; Prostate Cancer (age of onset: 70 - 79) in his maternal grandfather.     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 Performs ADLs/IADLs independently.      He  reports that he has never smoked. He has never used smokeless tobacco. He reports current alcohol use. He reports that he does not use drugs.        Current Medications:   He  currently has no medications in their medication list.     Allergies:   No Known Allergies    PHYSICAL EXAMINATION:  /69   Pulse 73   Wt 85.7 kg (189 lb)   BMI 27.36 kg/m     --CONSTITUTIONAL: Vital signs as above. No acute distress. The patient is well nourished and well groomed.  --PSYCHIATRIC: The patient is awake, alert, oriented to person, place, time and answering questions appropriately with clear speech. Appropriate mood and affect   --SKIN:  Posterior torso is clean, dry, intact without rashes.   --RESPIRATORY: Normal rhythm and effort. No abnormal accessory muscle breathing patterns noted.   --GROSS MOTOR: Easily arises from a seated position. Toe walking and heel walking are normal.    --STANDING EXAMINATION: Gait is non-antalgic. Normal lumbar lordosis noted, no lateral shift.  --LOWER EXTREMITY MOTOR TESTING:  Plantar flexion left 5/5, right 5/5   Dorsiflexion left 5/5, right 5/5   Great toe MTP extension left 5/5, right 5/5  Knee flexion left 5/5, right 5/5  Knee extension left 5/5, right 5/5   Hip flexion left 5/5, right 5/5  --MUSCULOSKELETAL: Lumbar spine inspection reveals no evidence of deformity. Range of motion is not limited in lumbar flexion, extension, lateral rotation.  Lumbar flexion does not elicit pain on exam no tenderness to palpation lumbar spine. Straight leg raise negative bilaterally. Sciatic notch non-tender. Facet loading maneuvers are positive on the left side, and negative on the right side.  Muscle fullness lumbar paraspinals. Localized pain around L5, S1 on the right side elicits mild pain  --SACROILIAC JOINT: Olvin positive on the right side.   Seng's negative bilaterally. Gaenslen's negative bilaterally.  Sacroiliac Joint Compression Test negative bilaterally.   --HIPS: Full range of motion bilaterally. Negative FABERs on the involved lower extremity. No pain with logrolling. No pain with palpation of the greater trochanter.   --NEUROLOGIC: 2/4 patellar and  achilles reflexes bilaterally.  Sensation to light touch is intact in the bilateral L4, L5, and S1 dermatomes.  No clonus.    --VASCULAR:  Warm lower limbs bilaterally. There is no edema of the bilateral lower extremities.       ASSESSMENT:  Lew Benitez is a pleasant 32 year old male who presents with chronic, intermittent, bilateral, nonradiating low back pain which started after jumping off a rock 2 years ago.  This pain is mostly positional, aggravated with lumbar flexion in particular.  Suspect pain may be myofascial, possibly with a component of facet mediated pain.      PLAN:  -X-ray x-ray of the lumbar spine 3/3/2022 and x-ray of the sacrum/coccyx 9/27/2023 were unremarkable.  Discussed possibly updating the x-ray of the lumbar spine, however, given that his symptoms are unchanged, he prefers to hold off for now.  -Add referral for physical therapy.  We discussed the importance of a home exercise program.  -RTC after 6 weeks of PT and home exercise program.  If his symptoms are unchanged at that time, will add MRI of the lumbar spine.    Ready to learn, no apparent learning barriers.  Education provided on treatment plan according to patient's preferred learning style.  Patient verbalizes understanding.   __________________________________      45 minutes spent by me on the date of the encounter doing chart review, history and exam, documentation and further activities per the note         Again, thank you for allowing me to participate in the care of your patient.      Sincerely,    JUJU Mason CNP

## 2023-10-30 ENCOUNTER — THERAPY VISIT (OUTPATIENT)
Dept: PHYSICAL THERAPY | Facility: CLINIC | Age: 33
End: 2023-10-30
Attending: NURSE PRACTITIONER
Payer: COMMERCIAL

## 2023-10-30 DIAGNOSIS — M62.838 MUSCLE SPASM: ICD-10-CM

## 2023-10-30 DIAGNOSIS — M54.50 CHRONIC BILATERAL LOW BACK PAIN WITHOUT SCIATICA: ICD-10-CM

## 2023-10-30 DIAGNOSIS — G89.29 CHRONIC BILATERAL LOW BACK PAIN WITHOUT SCIATICA: ICD-10-CM

## 2023-10-30 PROCEDURE — 97161 PT EVAL LOW COMPLEX 20 MIN: CPT | Mod: GP | Performed by: PHYSICAL THERAPIST

## 2023-10-30 PROCEDURE — 97110 THERAPEUTIC EXERCISES: CPT | Mod: GP | Performed by: PHYSICAL THERAPIST

## 2023-10-30 NOTE — PROGRESS NOTES
PHYSICAL THERAPY EVALUATION  Type of Visit: Evaluation  Patient reports chronic low back pain that started about 2 years ago.  He was traveling and took a photo, then jumped off a rock about 4 feet high.  When he landed, he had low back pain and had difficulty bending.  Pain has persisted intermittently since this time.  Symptoms are mostly brought on by lifting, bending, prolonged standing, prolonged walking.  He saw his MD on 10/09/2023 who ordered PT.  See electronic medical record for Abuse and Falls Screening details.    Subjective       Presenting condition or subjective complaint: LBP  Date of onset: 10/09/23    Relevant medical history:   No past medical history on file.    Dates & types of surgery:    Past Surgical History:   Procedure Laterality Date    APPENDECTOMY           Prior diagnostic imaging/testing results: X-ray     Prior therapy history for the same diagnosis, illness or injury: No      Prior Level of Function  Transfers: Independent  Ambulation: Independent  ADL: Independent  IADL:     Living Environment  Social support: With a significant other or spouse   Type of home: House   Stairs to enter the home: No       Ramp: Yes   Stairs inside the home: Yes 20 Is there a railing: Yes   Help at home: None  Equipment owned:       Employment: Yes   Hobbies/Interests: Outdoor    Patient goals for therapy: bending, lifting, prolonged standing    Pain assessment: Location: B LBP/Ratin-5/10     Objective   LUMBAR SPINE EVALUATION  PAIN:   INTEGUMENTARY (edema, incisions):   POSTURE:   GAIT:   Weightbearing Status:   Assistive Device(s):   Gait Deviations:   BALANCE/PROPRIOCEPTION:   WEIGHTBEARING ALIGNMENT:   NON-WEIGHTBEARING ALIGNMENT:    ROM: Lumbar AROM:  flexion nil loss, NE; extension   PELVIC/SI SCREEN:   STRENGTH:   Work mechanical stresses:  sitting, computer work, has standing desk so stands sometimes   Leisure mechanical stresses: walking dog, yardork  Functional disability  score (HUGO/STarT Back):    VAS score (0-10): 1-5/10      ADDITIONAL HISTORY:  Present symptoms: B LBP  Pain quality: Aching and stiff  Paresthesia (yes/no):  no  Symptoms (improving/unchanging/worsening):  unchanging.   Symptoms commenced as a result of: jumped off a 4-foot platform 2 years ago   Condition occurred in the following environment:   community     Symptoms at onset (back/thigh/leg): B LBP  Constant symptoms (back/thigh/leg): none  Intermittent symptoms (back/thigh/leg): B LBP    Symptoms are made worse with the following: bending, lifting, standing 1 hour, walking 3-4 hours   Symptoms are made better with the following: avoiding aggravators    Disturbed sleep (yes/no):  no Sleeping postures (prone/sup/side R/L): sides, supine    Previous episodes (0/1-5/6-10/11+): 0 Year of first episode: 2021    Previous history: intermittent LBP since jumping from a 4-ft height in 2021  Previous treatments: none    Specific Questions:  Cough/Sneeze/Strain (pos/neg): neg  Bowel/Bladder (normal/abnormal): normal  Gait (normal/abnormal): normal  Medications (nil/NSAIDS/analg/steroids/anticoag/other):  see above  Medical allergies:  see chart  General health (excellent/good/fair/poor):  good  Pertinent medical history:  see above  Imaging (None/Xray/MRI/Other):  none  Recent or major surgery (yes/no):  no  Night pain (yes/no): no  Accidents (yes/no): no  Unexplained weight loss (yes/no): no  Barriers at home: no  Other red flags: no    Postural Observation:   Sitting:  slouched   Change of posture: Better  Standing: Neutral  Lateral Shift: Nil.  Other Observations: none    Neurological:  Motor Deficit:  Not assessed--no radicular complaints     Reflexes:  Not assessed--no radicular complaints    Sensory Deficit:  Not assessed--no radicular complaints     Neurodynamic tests:  Not assessed--no radicular complaints      Test Movements:   During: produces, abolishes, increases, decreases, no effect, centralizing,  peripheralizing   After: better, worse, no better, no worse, no effect, centralized, peripheralized    Symptomatic response Mechanical response    During testing After testing Effect - increased ROM, decreased ROM, or key functional test No Effect   Pretest symptoms standing:      Rep FIS       Rep EIS         Pretest symptoms lying: supine hooklying--no LBP    During testing After testing Effect - increased ROM, decreased ROM, or key functional test No Effect   Rep COLT Increases    No Worse     x   Rep EIL Increases    No Worse     x     If required, pretest symptoms:    During testing After testing Effect - increased ROM, decreased ROM, or key functional test No Effect   Rep SGIS - R       Rep SGIS - L         Static Tests:  RFISit--NE, NE, NE    Provisional Classification: Inconclusive/Other - Mechanically Inconclusive    Potential Drivers of Pain and/or Disability: none    Principle of Management:  Education:  Posture--use of lumbar roll in sitting, importance of posture; POC, treatment rationale, expected response   Equipment provided:  none  Mechanical therapy (Y/N):  y   Extension principle:  REIL x10 reps, every 2 hours  Lateral Principle:    Flexion principle:    Other:    MYOTOMES:   DTR S:   CORD SIGNS:   DERMATOMES:   NEURAL TENSION:   FLEXIBILITY:   LUMBAR/HIP Special Tests:    PELVIS/SI SPECIAL TESTS:   FUNCTIONAL TESTS:   PALPATION:   SPINAL SEGMENTAL CONCLUSIONS:       Assessment & Plan   CLINICAL IMPRESSIONS  Medical Diagnosis: Chronic bilateral low back pain without sciatica    Treatment Diagnosis: LBP   Impression/Assessment: Patient is a 32 year old male with LB complaints.  The following significant findings have been identified: Pain, Decreased ROM/flexibility, Decreased activity tolerance, and Impaired posture. These impairments interfere with their ability to perform self care tasks, work tasks, household chores, and community mobility as compared to previous level of function.     Clinical  Decision Making (Complexity):  Clinical Presentation: Stable/Uncomplicated  Clinical Presentation Rationale: based on medical and personal factors listed in PT evaluation  Clinical Decision Making (Complexity): Low complexity    PLAN OF CARE  Treatment Interventions:  Interventions: Manual Therapy, Neuromuscular Re-education, Therapeutic Activity, Therapeutic Exercise, Self-Care/Home Management    Long Term Goals     PT Goal 1  Goal Identifier: bending  Goal Description: Patient will be able to bend without LBP  Rationale: to maximize safety and independence with performance of ADLs and functional tasks;to maximize safety and independence with self cares  Goal Progress: currently  up to 5/10 pain with bending  Target Date: 12/25/23      Frequency of Treatment: 2x/month  Duration of Treatment: 2 months    Recommended Referrals to Other Professionals: Physical Therapy  Education Assessment:   Learner/Method: Patient;Listening;Reading;Demonstration;Pictures/Video;No Barriers to Learning    Risks and benefits of evaluation/treatment have been explained.   Patient/Family/caregiver agrees with Plan of Care.     Evaluation Time:     PT Eval, Low Complexity Minutes (05316): 20     Signing Clinician: Angela Thomas PT

## 2023-11-02 ENCOUNTER — OFFICE VISIT (OUTPATIENT)
Dept: ORTHOPEDICS | Facility: CLINIC | Age: 33
End: 2023-11-02
Payer: COMMERCIAL

## 2023-11-02 VITALS — DIASTOLIC BLOOD PRESSURE: 76 MMHG | SYSTOLIC BLOOD PRESSURE: 114 MMHG

## 2023-11-02 DIAGNOSIS — M79.644 PAIN OF FINGER OF RIGHT HAND: ICD-10-CM

## 2023-11-02 DIAGNOSIS — S63.630A SPRAIN OF INTERPHALANGEAL JOINT OF RIGHT INDEX FINGER, INITIAL ENCOUNTER: Primary | ICD-10-CM

## 2023-11-02 PROCEDURE — 99203 OFFICE O/P NEW LOW 30 MIN: CPT | Performed by: FAMILY MEDICINE

## 2023-11-02 ASSESSMENT — PAIN SCALES - GENERAL: PAINLEVEL: MILD PAIN (2)

## 2023-11-02 NOTE — PATIENT INSTRUCTIONS
Please use in AM morning and night PM (Twice/day) to index finger joint - Use small pea sized amount.     Thank you for choosing [facility]    DR [facility] CLINIC LOCATIONS  [place] [address] [facility], [address]th [place]   [place], [address] [place] [address]   [phone]       APPOINTMENTS: [phone]    CARE QUESTIONS: [phone],    BILLING QUESTIONS: [phone]    FAX NUMBER: [phone]        Follow up: 6-8 weeks      1. Sprain of interphalangeal joint of right index finger, initial encounter    2. Pain of finger of right hand      Physical Therapy orders have been placed with [facility] (formally [facility])  You can call [phone] to schedule at your convenience.

## 2023-11-02 NOTE — PROGRESS NOTES
CHIEF COMPLAINT:  Pain of the Right Hand     HISTORY OF PRESENT ILLNESS  Mr. Benitez is a pleasant 32 year old year old male who presents to clinic today with R index finger pain.  Lew explains that it has been bothering him for 6-8 months and he identifies that it got injured when he was walking his dog and it pulled the leash out of his hands quickly.     Onset: unchanged  Location: right index finger at the lateral side of the PIP joint  Quality:  aching and dull  Duration: 1 years   Severity: 2/10 at worst  Timing:intermittent episodes with   Modifying factors:  resting and non-use makes it better, movement and use makes it worse  Associated signs & symptoms: denies  Previous similar pain: No  Treatments to date:None    Additional history: as documented    Review of Systems:  Have you recently had a a fever, chills, weight loss? No  Do you have any vision problems? No  Do you have any chest pain or edema? No  Do you have any shortness of breath or wheezing?  No  Do you have stomach problems? No  Do you have any numbness or focal weakness? No  Do you have diabetes? No  Do you have problems with bleeding or clotting? No  Do you have an rashes or other skin lesions? No    MEDICAL HISTORY  Patient Active Problem List   Diagnosis    Hypertriglyceridemia    Elevated glucose    Bilateral hearing loss, unspecified hearing loss type    Chronic bilateral low back pain without sciatica       No current outpatient medications on file.       No Known Allergies    Family History   Problem Relation Age of Onset    No Known Problems Mother     No Known Problems Father     Diabetes Maternal Grandfather     Prostate Cancer Maternal Grandfather 70 - 79       Additional medical/Social/Surgical histories reviewed in [facility] and updated as appropriate.       PHYSICAL EXAM  /76     Vital Signs: /76  Patient declined being weighed. There is no height or weight on file to calculate BMI.    General  - normal appearance, in no  obvious distress  CV  - normal radial pulse  Pulm  - normal respiratory pattern, non-labored  Musculoskeletal - right index finger  - inspection: no atrophy, normal joint alignment, Trace swelling PIP joint  - palpation: Tenderness at radial aspect of PIP joint. Non-tender volar plate or ulnar aspect.  - ROM:  MCP 90 deg flexion   0 deg extension    deg flexion   0 deg extension - Full   DIP 80 deg flexion   0 deg extension  - strength: 5/5  strength, 5/5 wrist abduction, 5/5 flexion, extension, pronation, supination, adduction  - special tests:  (-) varus  (+) valgus PIP with pain, no laxity  Neuro  - no numbness, no motor deficit, grossly normal coordination, normal muscle tone  Skin  - no ecchymosis, erythema, warmth, or induration, no obvious rash  Psych  - interactive, appropriate, normal mood and affect    IMAGING :   XR FINGER RIGHT G/E 2 VIEWS   9/27/2023 9:06 AM      HISTORY: PIP  joint pain after being pulled when walking dog a year  ago. pain persisting; Pain of finger of right hand  COMPARISON: None.                                                                       IMPRESSION: Normal joint spaces and alignment. No fracture.     HELENE HODGES MD      ASSESSMENT & PLAN  Mr. Benitez is a 32 year old year old male who presents to clinic today with ongoing chronic pain of PIP joint of right index finger since injury 7-8 months ago.    Suspect dog leash caused torquing injury that injured radial collateral ligament.  No loss of motion and no significant laxity that would point towards surgical intervention.    Diagnosis:   Sprain of radial collateral ligament of PIP joint of right index finger    No significant joint laxity valgus varus testing.  Suspect partial tear of the radial collateral ligament.  Discussed treatment options and at this time I would like him to start working with hand therapy here in [place].  Also recommend he begin using Voltaren gel to the PIP joint to work on mild  inflammation.  Continue to protect finger from valgus and varus stress.  I would like to see him back in 6 to 8 weeks.  If no improvement is noted at that time, we may consider an MRI of his right index finger only.    It was a pleasure seeing Lew today.    Jeremi Demarco DO, [facility]  Primary Care Sports Medicine

## 2023-11-02 NOTE — LETTER
11/2/2023         RE: Lew Benitez  [address] [place] [address]        Dear Colleague,    Thank you for referring your patient, Lew Benitez, to the [facility] [place]. Please see a copy of my visit note below.    CHIEF COMPLAINT:  Pain of the Right Hand     HISTORY OF PRESENT ILLNESS  Mr. Benitez is a pleasant 32 year old year old male who presents to clinic today with R index finger pain.  Lew explains that it has been bothering him for 6-8 months and he identifies that it got injured when he was walking his dog and it pulled the leash out of his hands quickly.     Onset: unchanged  Location: right index finger at the lateral side of the PIP joint  Quality:  aching and dull  Duration: 1 years   Severity: 2/10 at worst  Timing:intermittent episodes with   Modifying factors:  resting and non-use makes it better, movement and use makes it worse  Associated signs & symptoms: denies  Previous similar pain: No  Treatments to date:None    Additional history: as documented    Review of Systems:  Have you recently had a a fever, chills, weight loss? No  Do you have any vision problems? No  Do you have any chest pain or edema? No  Do you have any shortness of breath or wheezing?  No  Do you have stomach problems? No  Do you have any numbness or focal weakness? No  Do you have diabetes? No  Do you have problems with bleeding or clotting? No  Do you have an rashes or other skin lesions? No    MEDICAL HISTORY  Patient Active Problem List   Diagnosis     Hypertriglyceridemia     Elevated glucose     Bilateral hearing loss, unspecified hearing loss type     Chronic bilateral low back pain without sciatica       No current outpatient medications on file.       No Known Allergies    Family History   Problem Relation Age of Onset     No Known Problems Mother      No Known Problems Father      Diabetes Maternal Grandfather      Prostate Cancer Maternal Grandfather 70 - 79       Additional  medical/Social/Surgical histories reviewed in [facility] and updated as appropriate.       PHYSICAL EXAM  /76     Vital Signs: /76  Patient declined being weighed. There is no height or weight on file to calculate BMI.    General  - normal appearance, in no obvious distress  CV  - normal radial pulse  Pulm  - normal respiratory pattern, non-labored  Musculoskeletal - right index finger  - inspection: no atrophy, normal joint alignment, Trace swelling PIP joint  - palpation: Tenderness at radial aspect of PIP joint. Non-tender volar plate or ulnar aspect.  - ROM:  MCP 90 deg flexion   0 deg extension    deg flexion   0 deg extension - Full   DIP 80 deg flexion   0 deg extension  - strength: 5/5  strength, 5/5 wrist abduction, 5/5 flexion, extension, pronation, supination, adduction  - special tests:  (-) varus  (+) valgus PIP with pain, no laxity  Neuro  - no numbness, no motor deficit, grossly normal coordination, normal muscle tone  Skin  - no ecchymosis, erythema, warmth, or induration, no obvious rash  Psych  - interactive, appropriate, normal mood and affect    IMAGING :   XR FINGER RIGHT G/E 2 VIEWS   9/27/2023 9:06 AM      HISTORY: PIP  joint pain after being pulled when walking dog a year  ago. pain persisting; Pain of finger of right hand  COMPARISON: None.                                                                       IMPRESSION: Normal joint spaces and alignment. No fracture.     HELENE HODGES MD      ASSESSMENT & PLAN  Mr. Benitez is a 32 year old year old male who presents to clinic today with ongoing chronic pain of PIP joint of right index finger since injury 7-8 months ago.    Suspect dog leash caused torquing injury that injured radial collateral ligament.  No loss of motion and no significant laxity that would point towards surgical intervention.    Diagnosis:   Sprain of radial collateral ligament of PIP joint of right index finger    No significant joint laxity valgus varus  testing.  Suspect partial tear of the radial collateral ligament.  Discussed treatment options and at this time I would like him to start working with hand therapy here in [place].  Also recommend he begin using Voltaren gel to the PIP joint to work on mild inflammation.  Continue to protect finger from valgus and varus stress.  I would like to see him back in 6 to 8 weeks.  If no improvement is noted at that time, we may consider an MRI of his right index finger only.    It was a pleasure seeing Lew today.    Jeremi Demarco DO, [facility]  Primary Care Sports Medicine       Again, thank you for allowing me to participate in the care of your patient.        Sincerely,        Jeremi Demarco DO

## 2023-11-15 NOTE — PROGRESS NOTES
PM&R Follow-Up Visit -     Date of Initial Visit: 10/9/2023  LOV: 10/9/2023  TD: 11/20/2023     Recall: Lew Benitez is a 32 year old male who presents with a chief complaint of chronic low back pain.      INTERVAL HISTORY:  Patient was last seen in clinic 10/9/2023. At that visit, the plan was to begin physical therapy and a home exercise program.    He was seen today in the clinic for follow-up.  He says that he started physical therapy and began working on a home exercise program.  Unfortunately he did not notice any improvement.  He continues to notice a constant level of bilateral low back pain, which becomes worse when he does bending movements.  The pain is equal on both sides.  Additionally, lumbar extension and twisting also cause pain.  He does not use any medications for pain.  Currently while seated he rates his pain 3-4/10.  He denies weakness, falls, bowel or bladder incontinence, or saddle anesthesia.      RECALL HISTORY OF PRESENT ILLNESS:  Lew Benitez is a 32 year old male who presents with a chief complaint of chronic low back pain.     He was seen today in the clinic.  He reports chronic low back pain that started about 2 years ago.  He was traveling and took a photo, then jumped off a rock about 4 feet high.  When he landed, he had low back pain and had difficulty bending.  As he traveled back home, the pain somewhat improved.    However, since that time he has experienced persistent, intermittent, localized, nonradiating, bilateral low back pain most days. Certain positions seem to aggravate the pain.  In particular, lumbar flexion bothers him.  For example, if he is bending forward to wash his face for a few minutes, his low back starts to hurt.  Sitting, lifting, or prolonged walking also bring on the pain.  He says the pain affects his ability to stand up straight.  Generally, the pain happens transiently / with certain positions.  He did have a flare of pain last year which lasted a full day,  that started after he woke up and just turned off his alarm.      He cannot identify any particular relieving factors.  He does not take any medication for pain.  Currently, while sitting he is not having any pain. His sleep has not been affected.    He was seen through a work health clinic previously. Imaging has included x-ray of the lumbar spine and sacrum/coccyx which were unremarkable.      He denies falls, weakness, bowel or bladder incontinence, saddle anesthesia, unintentional weight loss, fevers/chills, or night sweats.       PRIOR INJURIES/TREATMENT:   Ice/Heat: none    Brace: none    Physical Therapy:   Began PT     - Current Pain Medications -   no    - Prior/Trialed Pain Medications -   none    Prior Procedures:  Date    Procedure   Improvement (%)  none              Prior Related Surgery: none   Other (acupuncture, OMT, CMM, TENS, DME, etc.): none    Specialists Seen - (with most recent, available notes and clinic visits reviewed)   1. none       IMAGING - reviewed   XR SACRUM AND COCCYX 2 VIEWS 9/27/2023   IMPRESSION:    1.  No acute fracture or malalignment.  2.  No significant degenerative changes or erosive change involving  the sacroiliac joints.      PLAIN RADIOGRAPHS LUMBAR SPINE 2 VIEWS 3/3/2022  INTERPRETATION: There are 5 nonrib-bearing lumbar-type vertebral bodies. Normal lordotic curvature. No loss of vertebral body height. No loss of disc space height. No spondylolisthesis.    CONCLUSION: Unremarkable plain radiographs of the lumbar spine.        Review Of Systems:  I am responding to those symptoms which are directly relevant to the specific indication for my consultation. I recommend that the patient follow up with their primary or referring provider to pursue any other symptoms which may be of concern.       Medical History:  He has no past medical history on file.     He has a past surgical history that includes appendectomy.      Family History  His family history includes Diabetes in  his maternal grandfather; No Known Problems in his father and mother; Prostate Cancer (age of onset: 70 - 79) in his maternal grandfather.     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 Performs ADLs/IADLs independently.      He  reports that he has never smoked. He has never used smokeless tobacco. He reports current alcohol use. He reports that he does not use drugs.        Current Medications:   He currently has no medications in their medication list.     Allergies:   No Known Allergies    PHYSICAL EXAMINATION:  /60   Pulse 52   SpO2 98%    --CONSTITUTIONAL: Vital signs as above. No acute distress. The patient is well nourished and well groomed.  --PSYCHIATRIC: The patient is awake, alert, oriented to person, place, time and answering questions appropriately with clear speech. Appropriate mood and affect   --SKIN:  Posterior torso is clean, dry, intact without rashes.   --RESPIRATORY: Normal rhythm and effort. No abnormal accessory muscle breathing patterns noted.   --GROSS MOTOR: Easily arises from a seated position. Toe walking and heel walking are normal.    --STANDING EXAMINATION: Gait is non-antalgic. Normal lumbar lordosis noted, no lateral shift.  --LOWER EXTREMITY MOTOR TESTING:  Plantar flexion left 5/5, right 5/5   Dorsiflexion left 5/5, right 5/5   Great toe MTP extension left 5/5, right 5/5  Knee flexion left 5/5, right 5/5  Knee extension left 5/5, right 5/5   Hip flexion left 5/5, right 5/5  --MUSCULOSKELETAL: Lumbar spine inspection reveals no evidence of deformity. Range of motion is not limited in lumbar flexion, extension, lateral rotation but all elicit pain. No tenderness to palpation lumbar spine or paraspinals. Straight leg raise negative bilaterally.  Facet loading maneuvers are positive bilat.  Muscle fullness lumbar paraspinals.  Indicates pain around the L4, L5, S1 area bilat.  --SACROILIAC  JOINT: No pain with palpation SI joint. Olvin positive bilat. Seng's negative bilaterally. Gaenslen's pos right, neg left.  Sacroiliac Joint Compression Test negative bilaterally. Yeomans neg bilat.  --HIPS: Full range of motion bilaterally. Negative FABERs on the involved lower extremity. No pain with logrolling. No pain with palpation of the greater trochanter.   --NEUROLOGIC: 2/4 patellar and achilles reflexes bilaterally.  Sensation to light touch is intact in the bilateral L4, L5, and S1 dermatomes.  No clonus.    --VASCULAR:  Warm lower limbs bilaterally. There is no edema of the bilateral lower extremities.       ASSESSMENT:  Lew Benitez is a pleasant 32 year old male who presents with chronic, intermittent, bilateral, nonradiating low back pain which started after jumping off a rock 2 years ago.  This pain is mostly positional, aggravated with lumbar flexion in particular as well as extension and twisting. There is some pain with facet loading bilat and he indicates pain around the SI joint.       PLAN:  -X-ray x-ray of the lumbar spine 3/3/2022 and x-ray of the sacrum/coccyx 9/27/2023 were unremarkable.    -Add MRI of the lumbar spine and sacrum/coccyx.  -Encouraged him to continue with physical therapy and a home exercise program.  We also discussed the option of other types of physical therapy such as [facility] or pool therapy.  He prefers to continue with the current land-based therapy  -We discussed that Tylenol up to 3000 mg/day or over-the-counter lidocaine patches could be an option. He prefers to avoid medications in general.    -RTC for review of the MRI    Ready to learn, no apparent learning barriers.  Education provided on treatment plan according to patient's preferred learning style.  Patient verbalizes understanding.   __________________________________  Ruthie Moctezuma NP  Physical Medicine & Rehabilitation        32 minutes spent by me on the date of the encounter doing chart review, history and  exam, documentation and further activities per the note

## 2023-11-20 ENCOUNTER — OFFICE VISIT (OUTPATIENT)
Dept: PHYSICAL MEDICINE AND REHAB | Facility: CLINIC | Age: 33
End: 2023-11-20
Payer: COMMERCIAL

## 2023-11-20 VITALS — SYSTOLIC BLOOD PRESSURE: 103 MMHG | HEART RATE: 52 BPM | OXYGEN SATURATION: 98 % | DIASTOLIC BLOOD PRESSURE: 60 MMHG

## 2023-11-20 DIAGNOSIS — M54.50 CHRONIC BILATERAL LOW BACK PAIN WITHOUT SCIATICA: Primary | ICD-10-CM

## 2023-11-20 DIAGNOSIS — M53.3 SI (SACROILIAC) JOINT DYSFUNCTION: ICD-10-CM

## 2023-11-20 DIAGNOSIS — M62.838 MUSCLE SPASM: ICD-10-CM

## 2023-11-20 DIAGNOSIS — G89.29 CHRONIC BILATERAL LOW BACK PAIN WITHOUT SCIATICA: Primary | ICD-10-CM

## 2023-11-20 DIAGNOSIS — M53.3 SACROILIAC JOINT PAIN: ICD-10-CM

## 2023-11-20 PROCEDURE — 99214 OFFICE O/P EST MOD 30 MIN: CPT | Performed by: NURSE PRACTITIONER

## 2023-11-20 ASSESSMENT — PAIN SCALES - GENERAL: PAINLEVEL: MODERATE PAIN (4)

## 2023-11-20 NOTE — LETTER
11/20/2023       RE: Lew Benitez  [address] [place] [address]     Dear Colleague,    Thank you for referring your patient, Lew Benitez, to the [facility] PHYSICAL MEDICINE AND REHABILITATION CLINIC [place] at [facility]. Please see a copy of my visit note below.    PM&R Follow-Up Visit -     Date of Initial Visit: 10/9/2023  LOV: 10/9/2023  TD: 11/20/2023     Recall: Lew Benitez is a 32 year old male who presents with a chief complaint of chronic low back pain.      INTERVAL HISTORY:  Patient was last seen in clinic 10/9/2023. At that visit, the plan was to begin physical therapy and a home exercise program.    He was seen today in the clinic for follow-up.  He says that he started physical therapy and began working on a home exercise program.  Unfortunately he did not notice any improvement.  He continues to notice a constant level of bilateral low back pain, which becomes worse when he does bending movements.  The pain is equal on both sides.  Additionally, lumbar extension and twisting also cause pain.  He does not use any medications for pain.  Currently while seated he rates his pain 3-4/10.  He denies weakness, falls, bowel or bladder incontinence, or saddle anesthesia.      RECALL HISTORY OF PRESENT ILLNESS:  Lew Benitez is a 32 year old male who presents with a chief complaint of chronic low back pain.     He was seen today in the clinic.  He reports chronic low back pain that started about 2 years ago.  He was traveling and took a photo, then jumped off a rock about 4 feet high.  When he landed, he had low back pain and had difficulty bending.  As he traveled back home, the pain somewhat improved.    However, since that time he has experienced persistent, intermittent, localized, nonradiating, bilateral low back pain most days. Certain positions seem to aggravate the pain.  In particular, lumbar flexion bothers him.  For example, if he is  bending forward to wash his face for a few minutes, his low back starts to hurt.  Sitting, lifting, or prolonged walking also bring on the pain.  He says the pain affects his ability to stand up straight.  Generally, the pain happens transiently / with certain positions.  He did have a flare of pain last year which lasted a full day, that started after he woke up and just turned off his alarm.      He cannot identify any particular relieving factors.  He does not take any medication for pain.  Currently, while sitting he is not having any pain. His sleep has not been affected.    He was seen through a work health clinic previously. Imaging has included x-ray of the lumbar spine and sacrum/coccyx which were unremarkable.      He denies falls, weakness, bowel or bladder incontinence, saddle anesthesia, unintentional weight loss, fevers/chills, or night sweats.       PRIOR INJURIES/TREATMENT:   Ice/Heat: none    Brace: none    Physical Therapy:   Began PT     - Current Pain Medications -   no    - Prior/Trialed Pain Medications -   none    Prior Procedures:  Date    Procedure   Improvement (%)  none              Prior Related Surgery: none   Other (acupuncture, OMT, CMM, TENS, DME, etc.): none    Specialists Seen - (with most recent, available notes and clinic visits reviewed)   1. none       IMAGING - reviewed   XR SACRUM AND COCCYX 2 VIEWS 9/27/2023   IMPRESSION:    1.  No acute fracture or malalignment.  2.  No significant degenerative changes or erosive change involving  the sacroiliac joints.      PLAIN RADIOGRAPHS LUMBAR SPINE 2 VIEWS 3/3/2022  INTERPRETATION: There are 5 nonrib-bearing lumbar-type vertebral bodies. Normal lordotic curvature. No loss of vertebral body height. No loss of disc space height. No spondylolisthesis.    CONCLUSION: Unremarkable plain radiographs of the lumbar spine.        Review Of Systems:  I am responding to those symptoms which are directly relevant to the specific indication for my  consultation. I recommend that the patient follow up with their primary or referring provider to pursue any other symptoms which may be of concern.       Medical History:  He has no past medical history on file.     He has a past surgical history that includes appendectomy.      Family History  His family history includes Diabetes in his maternal grandfather; No Known Problems in his father and mother; Prostate Cancer (age of onset: 70 - 79) in his maternal grandfather.     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 Performs ADLs/IADLs independently.      He  reports that he has never smoked. He has never used smokeless tobacco. He reports current alcohol use. He reports that he does not use drugs.        Current Medications:   He currently has no medications in their medication list.     Allergies:   No Known Allergies    PHYSICAL EXAMINATION:  /60   Pulse 52   SpO2 98%    --CONSTITUTIONAL: Vital signs as above. No acute distress. The patient is well nourished and well groomed.  --PSYCHIATRIC: The patient is awake, alert, oriented to person, place, time and answering questions appropriately with clear speech. Appropriate mood and affect   --SKIN:  Posterior torso is clean, dry, intact without rashes.   --RESPIRATORY: Normal rhythm and effort. No abnormal accessory muscle breathing patterns noted.   --GROSS MOTOR: Easily arises from a seated position. Toe walking and heel walking are normal.    --STANDING EXAMINATION: Gait is non-antalgic. Normal lumbar lordosis noted, no lateral shift.  --LOWER EXTREMITY MOTOR TESTING:  Plantar flexion left 5/5, right 5/5   Dorsiflexion left 5/5, right 5/5   Great toe MTP extension left 5/5, right 5/5  Knee flexion left 5/5, right 5/5  Knee extension left 5/5, right 5/5   Hip flexion left 5/5, right 5/5  --MUSCULOSKELETAL: Lumbar spine inspection reveals no evidence of deformity. Range  of motion is not limited in lumbar flexion, extension, lateral rotation but all elicit pain. No tenderness to palpation lumbar spine or paraspinals. Straight leg raise negative bilaterally.  Facet loading maneuvers are positive bilat.  Muscle fullness lumbar paraspinals.  Indicates pain around the L4, L5, S1 area bilat.  --SACROILIAC JOINT: No pain with palpation SI joint. Olivn positive bilat. Seng's negative bilaterally. Gaenslen's pos right, neg left.  Sacroiliac Joint Compression Test negative bilaterally. Yeomans neg bilat.  --HIPS: Full range of motion bilaterally. Negative FABERs on the involved lower extremity. No pain with logrolling. No pain with palpation of the greater trochanter.   --NEUROLOGIC: 2/4 patellar and achilles reflexes bilaterally.  Sensation to light touch is intact in the bilateral L4, L5, and S1 dermatomes.  No clonus.    --VASCULAR:  Warm lower limbs bilaterally. There is no edema of the bilateral lower extremities.       ASSESSMENT:  Lew Benitez is a pleasant 32 year old male who presents with chronic, intermittent, bilateral, nonradiating low back pain which started after jumping off a rock 2 years ago.  This pain is mostly positional, aggravated with lumbar flexion in particular as well as extension and twisting. There is some pain with facet loading bilat and he indicates pain around the SI joint.       PLAN:  -X-ray x-ray of the lumbar spine 3/3/2022 and x-ray of the sacrum/coccyx 9/27/2023 were unremarkable.    -Add MRI of the lumbar spine and sacrum/coccyx.  -Encouraged him to continue with physical therapy and a home exercise program.  We also discussed the option of other types of physical therapy such as MedX or pool therapy.  He prefers to continue with the current land-based therapy  -We discussed that Tylenol up to 3000 mg/day or over-the-counter lidocaine patches could be an option. He prefers to avoid medications in general.    -RTC for review of the MRI    Ready to learn,  no apparent learning barriers.  Education provided on treatment plan according to patient's preferred learning style.  Patient verbalizes understanding.   __________________________________    32 minutes spent by me on the date of the encounter doing chart review, history and exam, documentation and further activities per the note         Again, thank you for allowing me to participate in the care of your patient.      Sincerely,    JJUU Mason CNP

## 2023-11-20 NOTE — PATIENT INSTRUCTIONS
It was a pleasure seeing you in the clinic today.  As discussed, we made the following updates to your plan of care:     A MRI order was added today.  Radiology will call you to schedule or you can schedule after today's visit. You may also call [facility] Imaging Scheduling directly if you do not hear from them in the next couple of days.    Imaging scheduling  [facility] [phone] Clinics and Surgery Center [facility] ([facility] and [facility]), [facility], including [facility], [facility] [phone] [facility], [facility] Clinics including [place], [place], [place], [place], and [facility] [phone] [facility], [facility], [facility], [facility], including [place], [facility], and [facility] [phone] [facility], [facility], [facility] Clinics, including [place], [place] [facility], and [place]     An order for physical therapy was added at the last visit. You may also call [phone] to arrange an appointment at any of the [facility] outpatient physical therapy locations.  It will be very important for you to do the physical therapy exercises on a regular basis to decrease your pain and prevent future flares of pain.     You can try tylenol up to 3,000 mg/day or over the counter lidocaine patches to help with pain.    Please schedule follow up with me after MRI. (Please schedule the follow up at least 1 day after the MRI to give radiology time to do the report as well.)    Thank you,  Ruthie Moctezuma NP  Physical Medicine and Rehabilitation, Medical Spine  PM&R clinic Phone: [phone]  PM&R clinic Fax: [phone]

## 2023-11-20 NOTE — NURSING NOTE
Chief Complaint   Patient presents with    RECHECK     Follow up per last visit per patient PT is not working would like to discuss alternative to back pain   /60   Pulse 52   SpO2 98%     Jahaira Silva CMA at 7:02 AM on 11/20/2023.

## 2023-12-01 DIAGNOSIS — H91.93 BILATERAL HEARING LOSS, UNSPECIFIED HEARING LOSS TYPE: Primary | ICD-10-CM

## 2023-12-04 ENCOUNTER — THERAPY VISIT (OUTPATIENT)
Dept: PHYSICAL THERAPY | Facility: CLINIC | Age: 33
End: 2023-12-04
Payer: COMMERCIAL

## 2023-12-04 DIAGNOSIS — M54.50 CHRONIC BILATERAL LOW BACK PAIN WITHOUT SCIATICA: Primary | ICD-10-CM

## 2023-12-04 DIAGNOSIS — G89.29 CHRONIC BILATERAL LOW BACK PAIN WITHOUT SCIATICA: Primary | ICD-10-CM

## 2023-12-04 PROCEDURE — 97110 THERAPEUTIC EXERCISES: CPT | Mod: GP | Performed by: PHYSICAL THERAPIST

## 2023-12-04 PROCEDURE — 97140 MANUAL THERAPY 1/> REGIONS: CPT | Mod: GP | Performed by: PHYSICAL THERAPIST

## 2023-12-04 PROCEDURE — 97530 THERAPEUTIC ACTIVITIES: CPT | Mod: GP | Performed by: PHYSICAL THERAPIST

## 2023-12-06 ENCOUNTER — OFFICE VISIT (OUTPATIENT)
Dept: OTOLARYNGOLOGY | Facility: CLINIC | Age: 33
End: 2023-12-06
Payer: COMMERCIAL

## 2023-12-06 ENCOUNTER — OFFICE VISIT (OUTPATIENT)
Dept: AUDIOLOGY | Facility: CLINIC | Age: 33
End: 2023-12-06
Payer: COMMERCIAL

## 2023-12-06 ENCOUNTER — MYC MEDICAL ADVICE (OUTPATIENT)
Dept: FAMILY MEDICINE | Facility: CLINIC | Age: 33
End: 2023-12-06

## 2023-12-06 ENCOUNTER — PRE VISIT (OUTPATIENT)
Dept: OTOLARYNGOLOGY | Facility: CLINIC | Age: 33
End: 2023-12-06

## 2023-12-06 VITALS
WEIGHT: 184 LBS | OXYGEN SATURATION: 98 % | HEIGHT: 70 IN | BODY MASS INDEX: 26.34 KG/M2 | HEART RATE: 64 BPM | DIASTOLIC BLOOD PRESSURE: 67 MMHG | SYSTOLIC BLOOD PRESSURE: 88 MMHG | TEMPERATURE: 98 F

## 2023-12-06 DIAGNOSIS — H90.3 BILATERAL SENSORINEURAL HEARING LOSS: ICD-10-CM

## 2023-12-06 DIAGNOSIS — R06.83 SNORING: Primary | ICD-10-CM

## 2023-12-06 DIAGNOSIS — H90.3 SENSORINEURAL HEARING LOSS, BILATERAL: Primary | ICD-10-CM

## 2023-12-06 PROCEDURE — 92511 NASOPHARYNGOSCOPY: CPT | Performed by: REGISTERED NURSE

## 2023-12-06 PROCEDURE — 92565 STENGER TEST PURE TONE: CPT | Performed by: AUDIOLOGIST

## 2023-12-06 PROCEDURE — 99213 OFFICE O/P EST LOW 20 MIN: CPT | Mod: 25 | Performed by: REGISTERED NURSE

## 2023-12-06 PROCEDURE — 92557 COMPREHENSIVE HEARING TEST: CPT | Performed by: AUDIOLOGIST

## 2023-12-06 PROCEDURE — 92550 TYMPANOMETRY & REFLEX THRESH: CPT | Performed by: AUDIOLOGIST

## 2023-12-06 ASSESSMENT — PAIN SCALES - GENERAL: PAINLEVEL: MODERATE PAIN (5)

## 2023-12-06 NOTE — PROGRESS NOTES
"  Otolaryngology Clinic  December 6, 2023    Chief Complaint:   Bilateral sensorineural hearing loss and snoring      History of Present Illness:   Lew Benitez is a 33 year old male who presents today for evaluation of bilateral hearing loss and snoring.  Patient reports known documented high-frequency sensorineural hearing loss.  Patient works for [employer] and has had audiograms at his workplace demonstrating similar findings as audiogram today.  Patient denies any difficulty with day-to-day communication.  Will notice that he does not hear high-pitched sounds such as dogs barking.  Intermittent tinnitus that is not bothersome.  Denies any sudden change in hearing, fluctuating hearing loss, or significant noise exposure.  Family history of hearing loss with grandfather wearing hearing aids. Patient denies any otalgia, otorrhea, dizziness, facial numbness/weakness, history of frequent ear infections, or ear surgeries.     Also reports a history of snoring.  PCP recommended follow-up with ENT.  Patient has not had a formal sleep study.  Denies significant nasal congestion but does have nasal itching at times.      Past Medical History:  Past Medical History:   Diagnosis Date    Sensorineural hearing loss     Sleep apnea        Past Surgical History:  Past Surgical History:   Procedure Laterality Date    APPENDECTOMY         Medications:  No current outpatient medications on file.       Allergies:  No Known Allergies     Social History:  Social History     Tobacco Use    Smoking status: Never    Smokeless tobacco: Never   Substance Use Topics    Alcohol use: Not Currently     Comment: once a month    Drug use: Never       ROS: 10 point ROS neg other than the symptoms noted above in the HPI.    Physical Exam:    BP (!) 88/67   Pulse 64   Temp 98  F (36.7  C)   Ht 1.778 m (5' 10\")   Wt 83.5 kg (184 lb)   SpO2 98%   BMI 26.40 kg/m       Constitutional:  The patient was unaccompanied, well-groomed, and in no acute " distress.     Skin: Normal:  warm and pink without rash    Neurologic: Alert and oriented x 3.  CN's III-XII within normal limits.  Voice normal.    Psychiatric: The patient's affect was calm, cooperative, and appropriate.     Communication:  Normal; communicates verbally, normal voice quality.    Respiratory: Breathing comfortably without stridor or exertion of accessory muscles.    Ears: Pinnae and tragus non-tender.  EAC's and TM's were clear.     Oral Cavity: Normal tongue, floor of mouth, buccal mucosa, and palate.  No lesions or masses on inspection.     Oropharynx: Normal mucosa, palate symmetric with normal elevation. No abnormal lymph tissue in the oropharynx.        Flexible fiberoptic laryngoscopy: Scope exam was indicated due to snoring. Verbal consent was obtained. The nasal cavity was prepped with an aerosolized solution of topical anesthetic and vasoconstrictive agent. The scope was passed through the anterior nasal cavity and advanced. Inspection of the nasopharynx revealed no gross abnormality. There is no adenoid hypertrophy. Procedure tolerated well with no immediate complications noted.    Audiogram: 12/6/2023 - data independently reviewed  Normal steeply sloping to severe high frequency hearing loss bilaterally (10-30 dB asymmetry with right worse than left from 6-8 kHz)   % at 50 dB bilaterally  Tympanograms: type A bilaterally    Assessment and Plan:  1. Bilateral sensorineural hearing loss  Patient with bilateral high-frequency sensorineural hearing loss.  Reviewed audiogram with patient today.  Due to asymmetry in the higher frequencies discussed obtaining an MRI to rule out any sort of retrocochlear lesion.  Patient would like to proceed with MRI to evaluate.    Patient is not bothered by hearing loss at this time.  Recommend that patient continue to monitor hearing with annual audiograms.  Sooner for any new or sudden hearing loss.    2. Snoring  Patient reports history of snoring.   Scope exam today does not reveal any adenoid or tonsil hypertrophy.  Recommend that patient contact PCP to schedule a formal sleep study to further evaluate snoring.  If an ENT evaluation for management of obstructive sleep apnea is needed, patient should request a referral to an ENT sleep surgeon.     Patient will follow up in 1 year with audiogram.    Denise Chang DNP, APRN, CNP  Otolaryngology  Head & Neck Surgery  [phone]    30 minutes spent on the date of the encounter doing chart review, history and exam, documentation and further activities per the note excluding time performing scope exam.

## 2023-12-06 NOTE — PATIENT INSTRUCTIONS
- You were seen in the ENT Clinic today by Denise Chang NP.   - Proceed with MRI to evaluate asymmetric hearing loss  - Follow up with PCP to schedule sleep study to further assess snoring. Scope exam today does not show any adenoid hypertrophy.  - Please send a MyChart message or call the ENT clinic at [phone] with any questions or concerns.

## 2023-12-06 NOTE — LETTER
12/6/2023       RE: Lew Benitez  [address] [place] [address]     Dear Colleague,    Thank you for referring your patient, Lew Benitez, to the [facility] EAR NOSE AND THROAT CLINIC [place] at [facility]. Please see a copy of my visit note below.      Otolaryngology Clinic  December 6, 2023    Chief Complaint:   Bilateral sensorineural hearing loss and snoring      History of Present Illness:   Lew Benitez is a 33 year old male who presents today for evaluation of bilateral hearing loss and snoring.  Patient reports known documented high-frequency sensorineural hearing loss.  Patient works for [employer] and has had audiograms at his workplace demonstrating similar findings as audiogram today.  Patient denies any difficulty with day-to-day communication.  Will notice that he does not hear high-pitched sounds such as dogs barking.  Intermittent tinnitus that is not bothersome.  Denies any sudden change in hearing, fluctuating hearing loss, or significant noise exposure.  Family history of hearing loss with grandfather wearing hearing aids. Patient denies any otalgia, otorrhea, dizziness, facial numbness/weakness, history of frequent ear infections, or ear surgeries.     Also reports a history of snoring.  PCP recommended follow-up with ENT.  Patient has not had a formal sleep study.  Denies significant nasal congestion but does have nasal itching at times.      Past Medical History:  Past Medical History:   Diagnosis Date     Sensorineural hearing loss      Sleep apnea        Past Surgical History:  Past Surgical History:   Procedure Laterality Date     APPENDECTOMY         Medications:  No current outpatient medications on file.       Allergies:  No Known Allergies     Social History:  Social History     Tobacco Use     Smoking status: Never     Smokeless tobacco: Never   Substance Use Topics     Alcohol use: Not Currently     Comment: once a month      "Drug use: Never       ROS: 10 point ROS neg other than the symptoms noted above in the HPI.    Physical Exam:    BP (!) 88/67   Pulse 64   Temp 98  F (36.7  C)   Ht 1.778 m (5' 10\")   Wt 83.5 kg (184 lb)   SpO2 98%   BMI 26.40 kg/m       Constitutional:  The patient was unaccompanied, well-groomed, and in no acute distress.     Skin: Normal:  warm and pink without rash    Neurologic: Alert and oriented x 3.  CN's III-XII within normal limits.  Voice normal.    Psychiatric: The patient's affect was calm, cooperative, and appropriate.     Communication:  Normal; communicates verbally, normal voice quality.    Respiratory: Breathing comfortably without stridor or exertion of accessory muscles.    Ears: Pinnae and tragus non-tender.  EAC's and TM's were clear.     Oral Cavity: Normal tongue, floor of mouth, buccal mucosa, and palate.  No lesions or masses on inspection.     Oropharynx: Normal mucosa, palate symmetric with normal elevation. No abnormal lymph tissue in the oropharynx.        Flexible fiberoptic laryngoscopy: Scope exam was indicated due to snoring. Verbal consent was obtained. The nasal cavity was prepped with an aerosolized solution of topical anesthetic and vasoconstrictive agent. The scope was passed through the anterior nasal cavity and advanced. Inspection of the nasopharynx revealed no gross abnormality. There is no adenoid hypertrophy. Procedure tolerated well with no immediate complications noted.    Audiogram: 12/6/2023 - data independently reviewed  Normal steeply sloping to severe high frequency hearing loss bilaterally (10-30 dB asymmetry with right worse than left from 6-8 kHz)   % at 50 dB bilaterally  Tympanograms: type A bilaterally    Assessment and Plan:  1. Bilateral sensorineural hearing loss  Patient with bilateral high-frequency sensorineural hearing loss.  Reviewed audiogram with patient today.  Due to asymmetry in the higher frequencies discussed obtaining an MRI to " rule out any sort of retrocochlear lesion.  Patient would like to proceed with MRI to evaluate.    Patient is not bothered by hearing loss at this time.  Recommend that patient continue to monitor hearing with annual audiograms.  Sooner for any new or sudden hearing loss.    2. Snoring  Patient reports history of snoring.  Scope exam today does not reveal any adenoid or tonsil hypertrophy.  Recommend that patient contact PCP to schedule a formal sleep study to further evaluate snoring.  If an ENT evaluation for management of obstructive sleep apnea is needed, patient should request a referral to an ENT sleep surgeon.     Patient will follow up in 1 year with audiogram.    Denise Chang DNP, APRN, CNP  Otolaryngology  Head & Neck Surgery  [phone]    30 minutes spent on the date of the encounter doing chart review, history and exam, documentation and further activities per the note excluding time performing scope exam.          Again, thank you for allowing me to participate in the care of your patient.      Sincerely,    Franci Chang, NP

## 2023-12-06 NOTE — PROGRESS NOTES
AUDIOLOGY REPORT    SUMMARY: Audiology visit completed. See audiogram for results.      RECOMMENDATIONS: Follow-up with ENT.    Cynthia Jara, [facility]  Licensed Audiologist  [place] #[id number]

## 2023-12-07 ENCOUNTER — ANCILLARY PROCEDURE (OUTPATIENT)
Dept: MRI IMAGING | Facility: CLINIC | Age: 33
End: 2023-12-07
Attending: NURSE PRACTITIONER
Payer: COMMERCIAL

## 2023-12-07 DIAGNOSIS — M54.50 CHRONIC BILATERAL LOW BACK PAIN WITHOUT SCIATICA: ICD-10-CM

## 2023-12-07 DIAGNOSIS — M53.3 SI (SACROILIAC) JOINT DYSFUNCTION: ICD-10-CM

## 2023-12-07 DIAGNOSIS — M53.3 SACROILIAC JOINT PAIN: ICD-10-CM

## 2023-12-07 DIAGNOSIS — G89.29 CHRONIC BILATERAL LOW BACK PAIN WITHOUT SCIATICA: ICD-10-CM

## 2023-12-07 DIAGNOSIS — M62.838 MUSCLE SPASM: ICD-10-CM

## 2023-12-07 PROCEDURE — 72195 MRI PELVIS W/O DYE: CPT

## 2023-12-07 PROCEDURE — 72148 MRI LUMBAR SPINE W/O DYE: CPT

## 2023-12-13 ENCOUNTER — VIRTUAL VISIT (OUTPATIENT)
Dept: PHYSICAL MEDICINE AND REHAB | Facility: CLINIC | Age: 33
End: 2023-12-13
Payer: COMMERCIAL

## 2023-12-13 DIAGNOSIS — M53.3 SACROILIAC JOINT PAIN: ICD-10-CM

## 2023-12-13 DIAGNOSIS — M51.369 BULGING LUMBAR DISC: ICD-10-CM

## 2023-12-13 DIAGNOSIS — M62.838 MUSCLE SPASM: ICD-10-CM

## 2023-12-13 DIAGNOSIS — M53.3 SI (SACROILIAC) JOINT DYSFUNCTION: ICD-10-CM

## 2023-12-13 DIAGNOSIS — M54.50 CHRONIC BILATERAL LOW BACK PAIN WITHOUT SCIATICA: Primary | ICD-10-CM

## 2023-12-13 DIAGNOSIS — G89.29 CHRONIC BILATERAL LOW BACK PAIN WITHOUT SCIATICA: Primary | ICD-10-CM

## 2023-12-13 DIAGNOSIS — M47.816 FACET ARTHROPATHY, LUMBAR: ICD-10-CM

## 2023-12-13 PROCEDURE — 99215 OFFICE O/P EST HI 40 MIN: CPT | Mod: VID | Performed by: NURSE PRACTITIONER

## 2023-12-13 NOTE — NURSING NOTE
Is the patient currently in the state of [place]? YES    Visit mode:VIDEO    If the visit is dropped, the patient can be reconnected by: TELEPHONE VISIT: Phone number:   Telephone Information:   Mobile [phone]       Will anyone else be joining the visit? NO  (If patient encounters technical issues they should call [phone])    How would you like to obtain your AVS? MyChart    Are changes needed to the allergy or medication list? Pt stated no med changes    Reason for visit: Video Visit (Discuss MRI )    Angelika CORMIER

## 2023-12-13 NOTE — PROGRESS NOTES
"Virtual Visit Details    Type of service:  Video Visit     Originating Location (pt. Location): {video visit patient location:780825::\"Home\"}  {PROVIDER LOCATION On-site should be selected for visits conducted from your clinic location or adjoining [facility] hospital, academic office, or other nearby [facility] building. Off-site should be selected for all other provider locations, including home:455335}  Distant Location (provider location):  {virtual location provider:461407}  Platform used for Video Visit: {Virtual Visit Platforms:785680::\"Knowable\"}    "

## 2023-12-13 NOTE — PROGRESS NOTES
Lew is a 33 year old who is being evaluated via a billable video visit.      Video-Visit Details    Type of service:  Video Visit     Originating Location (pt. Location): Home    Distant Location (provider location):  Off-site  Platform used for Video Visit: Silvio    Joined the call at 12/13/2023, 8:33:23 am.  Left the call at 12/13/2023, 8:52:04 am.  You were on the call for 18 minutes 40 seconds .      PM&R Follow-Up Visit -     Date of Initial Visit: 10/9/23  LOV: 11/20/23  TD: 12/13/2023     Recall: Lew Benitez is a 32 year old male who presents with a chief complaint of chronic low back pain.     INTERVAL HISTORY:  Patient was last seen in clinic 11/20/23. At that visit, the plan was to obtain MRI of the lumbar spine and sacrum/coccy and continue PT/HEP. He was seen today via telehealth for follow-up of the recent MRIs.      RECALL HISTORY OF PRESENT ILLNESS:  Lew Benitez is a 32 year old male who presents with a chief complaint of chronic low back pain.     He was seen today in the clinic.  He reports chronic low back pain that started about 2 years ago.  He was traveling and took a photo, then jumped off a rock about 4 feet high.  When he landed, he had low back pain and had difficulty bending.  As he traveled back home, the pain somewhat improved.    However, since that time he has experienced persistent, intermittent, localized, nonradiating, bilateral low back pain most days. Certain positions seem to aggravate the pain.  In particular, lumbar flexion bothers him.  For example, if he is bending forward to wash his face for a few minutes, his low back starts to hurt.  Sitting, lifting, or prolonged walking also bring on the pain.  He says the pain affects his ability to stand up straight.  Generally, the pain happens transiently / with certain positions.  He did have a flare of pain last year which lasted a full day, that started after he woke up and just turned off his alarm.      He cannot identify any  particular relieving factors.  He does not take any medication for pain.  Currently, while sitting he is not having any pain. His sleep has not been affected.    He was seen through a work health clinic previously. Imaging has included x-ray of the lumbar spine and sacrum/coccyx which were unremarkable.      He denies falls, weakness, bowel or bladder incontinence, saddle anesthesia, unintentional weight loss, fevers/chills, or night sweats.       11/20/2023:  Patient was last seen in clinic 10/9/2023. At that visit, the plan was to begin physical therapy and a home exercise program.    He was seen today in the clinic for follow-up.  He says that he started physical therapy and began working on a home exercise program.  Unfortunately he did not notice any improvement.  He continues to notice a constant level of bilateral low back pain, which becomes worse when he does bending movements.  The pain is equal on both sides.  Additionally, lumbar extension and twisting also cause pain.  He does not use any medications for pain.  Currently while seated he rates his pain 3-4/10.  He denies weakness, falls, bowel or bladder incontinence, or saddle anesthesia.      PRIOR INJURIES/TREATMENT:   Ice/Heat: none    Brace: none    Physical Therapy:   Did 2 sessions of physical therapy for low back pain, most recently 12/4/2023     - Current Pain Medications -   none    - Prior/Trialed Pain Medications -   none    Prior Procedures:  Date    Procedure   Improvement (%)  none              Prior Related Surgery: none   Other (acupuncture, OMT, CMM, TENS, DME, etc.): none    Specialists Seen - (with most recent, available notes and clinic visits reviewed)   1. none       IMAGING - reviewed   MR LUMBAR SPINE W/O CONTRAST  12/7/2023   FINDINGS:   Nomenclature is based on 5 lumbar type vertebral bodies. Partial loss of the normal lordosis with mild levoconvex mid lumbar curvature without significant spondylolisthesis. Maintained vertebral  body heights. Mild to moderate intervertebral disc height   loss and desiccation at L5-S1. Maintained remainder of the intervertebral discs. No suspicious bone marrow signal intensity or significant focal edema. Unremarkable conus medullaris terminating at L1. Unremarkable cauda equina nerve roots. 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 Within technique limitations, no significant finding within the visualized abdominopelvic cavity.     T12-L1: No significant degenerative change, spinal canal stenosis, or foraminal narrowing.      L1-L2: No significant degenerative change, spinal canal stenosis, or foraminal narrowing.     L2-L3: Mild right facet arthrosis. No significant disc degenerative change, spinal canal stenosis, or foraminal narrowing. Prominent epidural fat.     L3-L4: No significant degenerative change, spinal canal stenosis, or foraminal narrowing.     L4-L5: A disc bulge and mild bilateral facet arthrosis without significant spinal canal or foraminal stenosis. Prominent epidural fat.     L5-S1: A disc bulge with a central/right subarticular superimposed disc protrusion and underlying annular fissure contacting the bilateral descending S1 nerve roots, greater on the right, without significant displacement. Along with mild bilateral facet   arthrosis, findings contribute to mild narrowing of the lateral recesses without significant central spinal canal stenosis. Mild left foraminal narrowing. No significant right foraminal stenosis.     Please refer to separately dictated MRI for findings regarding the sacrum.                                                       IMPRESSION:  1.  Disc bulge with a superimposed central/right subarticular disc protrusion and underlying annular fissure mildly narrowing the lateral recesses at L5-S1 and contacting the descending S1 nerve roots, more so on the right, without significant   displacement.  2.  No significant central  spinal canal stenosis.  3.  Mild left L5-S1 foraminal narrowing.            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 No joint effusion or synovitis. No erosions.      SOFT TISSUES:  -No edema, mass, or fluid collection.     Mild degenerative disc disease at L5-S1 with posterior annular tear, further evaluated on MRI lumbar spine. Incidental small perineural cysts along the mid sacral central canal.                                                   IMPRESSION:  1.  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 No evidence of sacroiliitis.      XR SACRUM AND COCCYX 2 VIEWS 9/27/2023   IMPRESSION:    1.  No acute fracture or malalignment.  2.  No significant degenerative changes or erosive change involving  the sacroiliac joints.      PLAIN RADIOGRAPHS LUMBAR SPINE 2 VIEWS 3/3/2022  INTERPRETATION: There are 5 nonrib-bearing lumbar-type vertebral bodies. Normal lordotic curvature. No loss of vertebral body height. No loss of disc space height. No spondylolisthesis.    CONCLUSION: Unremarkable plain radiographs of the lumbar spine.        Review Of Systems:  I am responding to those symptoms which are directly relevant to the specific indication for my consultation. I recommend that the patient follow up with their primary or referring provider to pursue any other symptoms which may be of concern.       Medical History:  Chronic low back pain    He has a past surgical history that includes appendectomy.      Family History  His family history includes Diabetes in his maternal grandfather; No Known Problems in his father and mother; Prostate Cancer (age of onset: 70 - 79) in his maternal grandfather.     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 Performs ADLs/IADLs independently.      He   reports that he has never smoked. He has never used smokeless tobacco. He reports current alcohol use. He reports that he does not use drugs.        Current Medications:   He currently has no medications in their medication list.     Allergies:   No Known Allergies    PHYSICAL EXAMINATION: *limited by nature of virtual visit   No vital signs taken for visit  --CONSTITUTIONAL: No acute distress.   --PSYCHIATRIC: The patient is awake, alert, oriented to person, place, time and answering questions appropriately with clear speech. Appropriate mood and affect         ASSESSMENT:  Lew Benitez is a pleasant 32 year old male who presents with chronic, intermittent, bilateral, nonradiating low back pain which started after jumping off a rock 2 years ago.  This pain is mostly positional, aggravated with lumbar flexion in particular as well as extension and twisting. There is some pain with facet loading bilat and he indicates pain around the SI joint.     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  There is also mild left NFS at the L5-S1 level.  -Lower lumbar facet arthropathy        PLAN:  -MRIs were reviewed with the patient today.  We discussed that the pain may be multifactorial, related to the disc bulge at L5-S1 and narrowing of the lateral recesses at that location abutting the S1 nerve roots, facet arthropathy in the lower lumbar spine, and/or SI joint degenerative changes  -He began physical therapy and has done 2 sessions so far.  He has not noticed much difference, though notes maintaining the home exercise program is somewhat difficult.  We discussed trying a different kind of physical therapy.  He was interested in [facility] at the  [facility].  The order was entered.  -Additionally he was interested in OMT.  I entered the order for him to see Dr. Cash in [place] for OMT.  -He prefers to avoid medications.  No medication changes made today.  -We discussed the option of diagnostic/therapeutic interventional procedures to help with pain (L5-S1 epidural steroid injection, MBB/RFA, SI joint steroid injection).  He prefers to hold off on any interventions at this point.  -RTC as needed, according to the patient's preference      Ready to learn, no apparent learning barriers.  Education provided on treatment plan according to patient's preferred learning style.  Patient verbalizes understanding.   __________________________________  Ruthie Moctezuma NP  Physical Medicine & Rehabilitation        40 minutes spent by me on the date of the encounter doing chart review, history and exam, documentation and further activities per the note

## 2023-12-13 NOTE — PATIENT INSTRUCTIONS
It was a pleasure seeing you by telehealth today.  As discussed, we made the following updates to your plan of care:     I added a new order for [facility] at [place]. You will receive a call from our schedulers to set up the appointment. You can call [phone] to schedule if you miss their call.    I added an order for OMT (osteopathic manipulation treatments) at [place] with Dr Cash.   You will receive a call from our schedulers to set up the appointment.     We will leave the follow up open ended as you preferred. Our clinic number is listed below if you would like to schedule a follow up. We are always happy to see you again in the clinic.    Thank you,  Ruthie Moctezuma NP  Physical Medicine and Rehabilitation, Medical Spine  PM&R clinic Phone: [phone]  PM&R clinic Fax: [phone]

## 2023-12-13 NOTE — LETTER
12/13/2023       RE: Lew Benitez  [address] [place] [address]       Dear Colleague,    Thank you for referring your patient, Lew Benitez, to the [facility] [place] [facility]. Please see a copy of my visit note below.    Lew is a 33 year old who is being evaluated via a billable video visit.      Joined the call at 12/13/2023, 8:33:23 am.  Left the call at 12/13/2023, 8:52:04 am.  You were on the call for 18 minutes 40 seconds .      PM&R Follow-Up Visit -     Date of Initial Visit: 10/9/23  LOV: 11/20/23  TD: 12/13/2023     Recall: Lew Benitez is a 32 year old male who presents with a chief complaint of chronic low back pain.     INTERVAL HISTORY:  Patient was last seen in clinic 11/20/23. At that visit, the plan was to obtain MRI of the lumbar spine and sacrum/coccy and continue PT/HEP. He was seen today via telehealth for follow-up of the recent MRIs.      RECALL HISTORY OF PRESENT ILLNESS:  Lew Benitez is a 32 year old male who presents with a chief complaint of chronic low back pain.     He was seen today in the clinic.  He reports chronic low back pain that started about 2 years ago.  He was traveling and took a photo, then jumped off a rock about 4 feet high.  When he landed, he had low back pain and had difficulty bending.  As he traveled back home, the pain somewhat improved.    However, since that time he has experienced persistent, intermittent, localized, nonradiating, bilateral low back pain most days. Certain positions seem to aggravate the pain.  In particular, lumbar flexion bothers him.  For example, if he is bending forward to wash his face for a few minutes, his low back starts to hurt.  Sitting, lifting, or prolonged walking also bring on the pain.  He says the pain affects his ability to stand up straight.  Generally, the pain happens transiently / with certain positions.  He did  have a flare of pain last year which lasted a full day, that started after he woke up and just turned off his alarm.      He cannot identify any particular relieving factors.  He does not take any medication for pain.  Currently, while sitting he is not having any pain. His sleep has not been affected.    He was seen through a work health clinic previously. Imaging has included x-ray of the lumbar spine and sacrum/coccyx which were unremarkable.      He denies falls, weakness, bowel or bladder incontinence, saddle anesthesia, unintentional weight loss, fevers/chills, or night sweats.       11/20/2023:  Patient was last seen in clinic 10/9/2023. At that visit, the plan was to begin physical therapy and a home exercise program.    He was seen today in the clinic for follow-up.  He says that he started physical therapy and began working on a home exercise program.  Unfortunately he did not notice any improvement.  He continues to notice a constant level of bilateral low back pain, which becomes worse when he does bending movements.  The pain is equal on both sides.  Additionally, lumbar extension and twisting also cause pain.  He does not use any medications for pain.  Currently while seated he rates his pain 3-4/10.  He denies weakness, falls, bowel or bladder incontinence, or saddle anesthesia.      PRIOR INJURIES/TREATMENT:   Ice/Heat: none    Brace: none    Physical Therapy:   Did 2 sessions of physical therapy for low back pain, most recently 12/4/2023     - Current Pain Medications -   none    - Prior/Trialed Pain Medications -   none    Prior Procedures:  Date    Procedure   Improvement (%)  none              Prior Related Surgery: none   Other (acupuncture, OMT, CMM, TENS, DME, etc.): none    Specialists Seen - (with most recent, available notes and clinic visits reviewed)   1. none       IMAGING - reviewed   MR LUMBAR SPINE W/O CONTRAST  12/7/2023   FINDINGS:   Nomenclature is based on 5 lumbar type vertebral  bodies. Partial loss of the normal lordosis with mild levoconvex mid lumbar curvature without significant spondylolisthesis. Maintained vertebral body heights. Mild to moderate intervertebral disc height   loss and desiccation at L5-S1. Maintained remainder of the intervertebral discs. No suspicious bone marrow signal intensity or significant focal edema. Unremarkable conus medullaris terminating at L1. Unremarkable cauda equina nerve roots. 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 Within technique limitations, no significant finding within the visualized abdominopelvic cavity.     T12-L1: No significant degenerative change, spinal canal stenosis, or foraminal narrowing.      L1-L2: No significant degenerative change, spinal canal stenosis, or foraminal narrowing.     L2-L3: Mild right facet arthrosis. No significant disc degenerative change, spinal canal stenosis, or foraminal narrowing. Prominent epidural fat.     L3-L4: No significant degenerative change, spinal canal stenosis, or foraminal narrowing.     L4-L5: A disc bulge and mild bilateral facet arthrosis without significant spinal canal or foraminal stenosis. Prominent epidural fat.     L5-S1: A disc bulge with a central/right subarticular superimposed disc protrusion and underlying annular fissure contacting the bilateral descending S1 nerve roots, greater on the right, without significant displacement. Along with mild bilateral facet   arthrosis, findings contribute to mild narrowing of the lateral recesses without significant central spinal canal stenosis. Mild left foraminal narrowing. No significant right foraminal stenosis.     Please refer to separately dictated MRI for findings regarding the sacrum.                                                       IMPRESSION:  1.  Disc bulge with a superimposed central/right subarticular disc protrusion and underlying annular fissure mildly narrowing the lateral  recesses at L5-S1 and contacting the descending S1 nerve roots, more so on the right, without significant   displacement.  2.  No significant central spinal canal stenosis.  3.  Mild left L5-S1 foraminal narrowing.            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 No joint effusion or synovitis. No erosions.      SOFT TISSUES:  -No edema, mass, or fluid collection.     Mild degenerative disc disease at L5-S1 with posterior annular tear, further evaluated on MRI lumbar spine. Incidental small perineural cysts along the mid sacral central canal.                                                   IMPRESSION:  1.  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 No evidence of sacroiliitis.      XR SACRUM AND COCCYX 2 VIEWS 9/27/2023   IMPRESSION:    1.  No acute fracture or malalignment.  2.  No significant degenerative changes or erosive change involving  the sacroiliac joints.      PLAIN RADIOGRAPHS LUMBAR SPINE 2 VIEWS 3/3/2022  INTERPRETATION: There are 5 nonrib-bearing lumbar-type vertebral bodies. Normal lordotic curvature. No loss of vertebral body height. No loss of disc space height. No spondylolisthesis.    CONCLUSION: Unremarkable plain radiographs of the lumbar spine.        Review Of Systems:  I am responding to those symptoms which are directly relevant to the specific indication for my consultation. I recommend that the patient follow up with their primary or referring provider to pursue any other symptoms which may be of concern.       Medical History:  Chronic low back pain    He has a past surgical history that includes appendectomy.      Family History  His family history includes Diabetes in his maternal grandfather; No Known Problems in his father and mother; Prostate Cancer (age of onset: 70 - 79) in his maternal grandfather.     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 Performs ADLs/IADLs independently.      He  reports that he has never smoked. He has never used smokeless tobacco. He reports current alcohol use. He reports that he does not use drugs.        Current Medications:   He currently has no medications in their medication list.     Allergies:   No Known Allergies    PHYSICAL EXAMINATION: *limited by nature of virtual visit   No vital signs taken for visit  --CONSTITUTIONAL: No acute distress.   --PSYCHIATRIC: The patient is awake, alert, oriented to person, place, time and answering questions appropriately with clear speech. Appropriate mood and affect         ASSESSMENT:  Lew Benitez is a pleasant 32 year old male who presents with chronic, intermittent, bilateral, nonradiating low back pain which started after jumping off a rock 2 years ago.  This pain is mostly positional, aggravated with lumbar flexion in particular as well as extension and twisting. There is some pain with facet loading bilat and he indicates pain around the SI joint.     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  There is also mild left NFS at the L5-S1 level.  -Lower lumbar facet arthropathy        PLAN:  -MRIs were reviewed with the patient today.  We discussed that the pain may be multifactorial, related to the disc bulge at L5-S1 and narrowing of the lateral recesses at that location abutting the S1 nerve roots, facet arthropathy in the lower lumbar spine, and/or SI joint degenerative changes  -He began physical therapy and has done 2 sessions so far.  He has not noticed much difference, though notes maintaining the home  exercise program is somewhat difficult.  We discussed trying a different kind of physical therapy.  He was interested in [facility] physical therapy at the [place] location.  The order was entered.  -Additionally he was interested in OMT.  I entered the order for him to see Dr. Cash in [place] for OMT.  -He prefers to avoid medications.  No medication changes made today.  -We discussed the option of diagnostic/therapeutic interventional procedures to help with pain (L5-S1 epidural steroid injection, MBB/RFA, SI joint steroid injection).  He prefers to hold off on any interventions at this point.  -RTC as needed, according to the patient's preference      Ready to learn, no apparent learning barriers.  Education provided on treatment plan according to patient's preferred learning style.  Patient verbalizes understanding.   ________________________________      40 minutes spent by me on the date of the encounter doing chart review, history and exam, documentation and further activities per the note         Again, thank you for allowing me to participate in the care of your patient.      Sincerely,    JUJU Mason CNP

## 2023-12-29 ENCOUNTER — HOSPITAL ENCOUNTER (OUTPATIENT)
Dept: MRI IMAGING | Facility: CLINIC | Age: 33
Discharge: HOME | End: 2023-12-29
Attending: REGISTERED NURSE | Admitting: REGISTERED NURSE
Payer: COMMERCIAL

## 2023-12-29 DIAGNOSIS — H90.3 BILATERAL SENSORINEURAL HEARING LOSS: ICD-10-CM

## 2023-12-29 PROCEDURE — 255N000002 HC RX 255 OP 636: Performed by: REGISTERED NURSE

## 2023-12-29 PROCEDURE — A9585 GADOBUTROL INJECTION: HCPCS | Performed by: REGISTERED NURSE

## 2023-12-29 PROCEDURE — 70543 MRI ORBT/FAC/NCK W/O &W/DYE: CPT

## 2023-12-29 RX ORDER — GADOBUTROL 604.72 MG/ML
8 INJECTION INTRAVENOUS ONCE
Status: COMPLETED | OUTPATIENT
Start: 2023-12-29 | End: 2023-12-29

## 2023-12-29 RX ADMIN — GADOBUTROL 8 ML: 604.72 INJECTION INTRAVENOUS at 10:40

## 2024-06-12 ENCOUNTER — MYC MEDICAL ADVICE (OUTPATIENT)
Dept: FAMILY MEDICINE | Facility: CLINIC | Age: 34
End: 2024-06-12
Payer: COMMERCIAL

## 2024-06-14 ENCOUNTER — ALLIED HEALTH/NURSE VISIT (OUTPATIENT)
Dept: FAMILY MEDICINE | Facility: CLINIC | Age: 34
End: 2024-06-14
Payer: COMMERCIAL

## 2024-06-14 DIAGNOSIS — Z23 NEED FOR VACCINATION: Primary | ICD-10-CM

## 2024-06-14 PROCEDURE — 99207 PR NO CHARGE NURSE ONLY: CPT

## 2024-06-14 PROCEDURE — 90716 VAR VACCINE LIVE SUBQ: CPT

## 2024-06-14 PROCEDURE — 90471 IMMUNIZATION ADMIN: CPT

## 2024-06-14 NOTE — PROGRESS NOTES
Prior to immunization administration, verified patients identity using patient s name and date of birth. Please see Immunization Activity for additional information.     Screening Questionnaire for Adult Immunization    Are you sick today?   No   Do you have allergies to medications, food, a vaccine component or latex?   No   Have you ever had a serious reaction after receiving a vaccination?   No   Do you have a long-term health problem with heart, lung, kidney, or metabolic disease (e.g., diabetes), asthma, a blood disorder, no spleen, complement component deficiency, a cochlear implant, or a spinal fluid leak?  Are you on long-term aspirin therapy?   No   Do you have cancer, leukemia, HIV/AIDS, or any other immune system problem?   No   Do you have a parent, brother, or sister with an immune system problem?   No   In the past 3 months, have you taken medications that affect  your immune system, such as prednisone, other steroids, or anticancer drugs; drugs for the treatment of rheumatoid arthritis, Crohn s disease, or psoriasis; or have you had radiation treatments?   No   Have you had a seizure, or a brain or other nervous system problem?   No   During the past year, have you received a transfusion of blood or blood    products, or been given immune (gamma) globulin or antiviral drug?   No   For women: Are you pregnant or is there a chance you could become       pregnant during the next month?   No   Have you received any vaccinations in the past 4 weeks?   No     Immunization questionnaire answers were all negative.    I have reviewed the following standing orders:   This patient is due and qualifies for the Varicella vaccine.    Click here for Varicella (Adult) Standing Order    I have reviewed the vaccines inclusion and exclusion criteria; No concerns regarding eligibility.     Patient instructed to remain in clinic for 15 minutes afterwards, and to report any adverse reactions.     Screening performed by  Rita Ordaz MA on 6/14/2024 at 2:01 PM.

## 2024-06-18 ENCOUNTER — TELEPHONE (OUTPATIENT)
Dept: FAMILY MEDICINE | Facility: CLINIC | Age: 34
End: 2024-06-18
Payer: COMMERCIAL

## 2024-06-18 NOTE — TELEPHONE ENCOUNTER
Patient calling in regards to his immunizations. He stated scheduling could not see an order for patient's MMR vaccine that he was told she placed.  Writer notes previous message from provider that she put in orders.  Order is in place, writer notified patient, and patient verified understanding and asked if he could have a screenshot of the order sent to [employer] so he has proof of the order. [employer] message sent to patient regarding immunization order for him.     Abdon Pink RN  [facility]

## 2024-06-19 ENCOUNTER — ALLIED HEALTH/NURSE VISIT (OUTPATIENT)
Dept: FAMILY MEDICINE | Facility: CLINIC | Age: 34
End: 2024-06-19
Payer: COMMERCIAL

## 2024-06-19 DIAGNOSIS — Z23 ENCOUNTER FOR IMMUNIZATION: Primary | ICD-10-CM

## 2024-06-19 PROCEDURE — 90471 IMMUNIZATION ADMIN: CPT

## 2024-06-19 PROCEDURE — 99207 PR NO CHARGE NURSE ONLY: CPT

## 2024-06-19 PROCEDURE — 90707 MMR VACCINE SC: CPT

## 2024-06-19 NOTE — PROGRESS NOTES
Prior to immunization administration, verified patients identity using patient s name and date of birth. Please see Immunization Activity for additional information.     Screening Questionnaire for Adult Immunization    Are you sick today?   No   Do you have allergies to medications, food, a vaccine component or latex?   No   Have you ever had a serious reaction after receiving a vaccination?   No   Do you have a long-term health problem with heart, lung, kidney, or metabolic disease (e.g., diabetes), asthma, a blood disorder, no spleen, complement component deficiency, a cochlear implant, or a spinal fluid leak?  Are you on long-term aspirin therapy?   No   Do you have cancer, leukemia, HIV/AIDS, or any other immune system problem?   No   Do you have a parent, brother, or sister with an immune system problem?   No   In the past 3 months, have you taken medications that affect  your immune system, such as prednisone, other steroids, or anticancer drugs; drugs for the treatment of rheumatoid arthritis, Crohn s disease, or psoriasis; or have you had radiation treatments?   No   Have you had a seizure, or a brain or other nervous system problem?   No   During the past year, have you received a transfusion of blood or blood    products, or been given immune (gamma) globulin or antiviral drug?   No   For women: Are you pregnant or is there a chance you could become       pregnant during the next month?   No   Have you received any vaccinations in the past 4 weeks?   Yes     Immunization questionnaire was positive for at least one answer. Patient has received Varicella five days ago, ok'd and Notified .    I have reviewed the following standing orders:   This patient is due and qualifies for the MMR vaccine.    Click here for full standing order document: MMR Adult Standing Order     I have reviewed the vaccines inclusion and exclusion criteria;No concerns regarding eligibility.     Patient instructed to remain in  clinic for 15 minutes afterwards, and to report any adverse reactions.     Screening performed by Rita Ordaz MA on 6/19/2024 at 3:07 PM.

## 2024-07-15 ENCOUNTER — ALLIED HEALTH/NURSE VISIT (OUTPATIENT)
Dept: FAMILY MEDICINE | Facility: CLINIC | Age: 34
End: 2024-07-15
Payer: COMMERCIAL

## 2024-07-15 DIAGNOSIS — Z23 ENCOUNTER FOR IMMUNIZATION: Primary | ICD-10-CM

## 2024-07-15 PROCEDURE — 90716 VAR VACCINE LIVE SUBQ: CPT

## 2024-07-15 PROCEDURE — 90471 IMMUNIZATION ADMIN: CPT

## 2024-07-15 PROCEDURE — 99207 PR NO CHARGE NURSE ONLY: CPT

## 2024-07-15 NOTE — PROGRESS NOTES
Prior to immunization administration, verified patients identity using patient s name and date of birth. Please see Immunization Activity for additional information.     Screening Questionnaire for Adult Immunization    Are you sick today?   No   Do you have allergies to medications, food, a vaccine component or latex?   No   Have you ever had a serious reaction after receiving a vaccination?   No   Do you have a long-term health problem with heart, lung, kidney, or metabolic disease (e.g., diabetes), asthma, a blood disorder, no spleen, complement component deficiency, a cochlear implant, or a spinal fluid leak?  Are you on long-term aspirin therapy?   No   Do you have cancer, leukemia, HIV/AIDS, or any other immune system problem?   No   Do you have a parent, brother, or sister with an immune system problem?   No   In the past 3 months, have you taken medications that affect  your immune system, such as prednisone, other steroids, or anticancer drugs; drugs for the treatment of rheumatoid arthritis, Crohn s disease, or psoriasis; or have you had radiation treatments?   No   Have you had a seizure, or a brain or other nervous system problem?   No   During the past year, have you received a transfusion of blood or blood    products, or been given immune (gamma) globulin or antiviral drug?   No   For women: Are you pregnant or is there a chance you could become       pregnant during the next month?   No   Have you received any vaccinations in the past 4 weeks?   No     Immunization questionnaire answers were all negative.    I have reviewed the following standing orders:   This patient is due and qualifies for the Varicella vaccine.    Click here for Varicella (Adult) Standing Order    I have reviewed the vaccines inclusion and exclusion criteria; No concerns regarding eligibility.     Patient instructed to remain in clinic for 15 minutes afterwards, and to report any adverse reactions.     Screening performed by  Diana Alcantar MA on 7/15/2024 at 4:56 PM.

## 2024-07-19 ENCOUNTER — ALLIED HEALTH/NURSE VISIT (OUTPATIENT)
Dept: FAMILY MEDICINE | Facility: CLINIC | Age: 34
End: 2024-07-19
Payer: COMMERCIAL

## 2024-07-19 DIAGNOSIS — Z23 ENCOUNTER FOR IMMUNIZATION: Primary | ICD-10-CM

## 2024-07-19 PROCEDURE — 90471 IMMUNIZATION ADMIN: CPT

## 2024-07-19 PROCEDURE — 90707 MMR VACCINE SC: CPT

## 2024-07-19 PROCEDURE — 99207 PR NO CHARGE NURSE ONLY: CPT

## 2024-07-19 NOTE — PROGRESS NOTES
Prior to immunization administration, verified patients identity using patient s name and date of birth. Please see Immunization Activity for additional information.     Screening Questionnaire for Adult Immunization    Are you sick today?   No   Do you have allergies to medications, food, a vaccine component or latex?   No   Have you ever had a serious reaction after receiving a vaccination?   No   Do you have a long-term health problem with heart, lung, kidney, or metabolic disease (e.g., diabetes), asthma, a blood disorder, no spleen, complement component deficiency, a cochlear implant, or a spinal fluid leak?  Are you on long-term aspirin therapy?   No   Do you have cancer, leukemia, HIV/AIDS, or any other immune system problem?   No   Do you have a parent, brother, or sister with an immune system problem?   No   In the past 3 months, have you taken medications that affect  your immune system, such as prednisone, other steroids, or anticancer drugs; drugs for the treatment of rheumatoid arthritis, Crohn s disease, or psoriasis; or have you had radiation treatments?   No   Have you had a seizure, or a brain or other nervous system problem?   No   During the past year, have you received a transfusion of blood or blood    products, or been given immune (gamma) globulin or antiviral drug?   No   For women: Are you pregnant or is there a chance you could become       pregnant during the next month?   No   Have you received any vaccinations in the past 4 weeks?   No     Immunization questionnaire answers were all negative.    I have reviewed the following standing orders:   This patient is due and qualifies for the MMR vaccine.    Click here for full standing order document: MMR Adult Standing Order     I have reviewed the vaccines inclusion and exclusion criteria;No concerns regarding eligibility.     Patient instructed to remain in clinic for 15 minutes afterwards, and to report any adverse reactions.     Screening  performed by Diana Alcantar MA on 7/19/2024 at 4:28 PM.

## 2024-10-01 ENCOUNTER — TELEPHONE (OUTPATIENT)
Dept: OTOLARYNGOLOGY | Facility: CLINIC | Age: 34
End: 2024-10-01
Payer: COMMERCIAL

## 2024-11-25 ENCOUNTER — OFFICE VISIT (OUTPATIENT)
Dept: FAMILY MEDICINE | Facility: CLINIC | Age: 34
End: 2024-11-25
Payer: COMMERCIAL

## 2024-11-25 VITALS
WEIGHT: 185.9 LBS | TEMPERATURE: 97.6 F | DIASTOLIC BLOOD PRESSURE: 70 MMHG | BODY MASS INDEX: 26.02 KG/M2 | RESPIRATION RATE: 16 BRPM | SYSTOLIC BLOOD PRESSURE: 96 MMHG | OXYGEN SATURATION: 98 % | HEIGHT: 71 IN | HEART RATE: 58 BPM

## 2024-11-25 DIAGNOSIS — Z00.00 ROUTINE GENERAL MEDICAL EXAMINATION AT A HEALTH CARE FACILITY: Primary | ICD-10-CM

## 2024-11-25 DIAGNOSIS — E78.1 HYPERTRIGLYCERIDEMIA: ICD-10-CM

## 2024-11-25 LAB
ALBUMIN SERPL BCG-MCNC: 4.6 G/DL (ref 3.5–5.2)
ALP SERPL-CCNC: 58 U/L (ref 40–150)
ALT SERPL W P-5'-P-CCNC: 18 U/L (ref 0–70)
ANION GAP SERPL CALCULATED.3IONS-SCNC: 8 MMOL/L (ref 7–15)
AST SERPL W P-5'-P-CCNC: 21 U/L (ref 0–45)
BILIRUB SERPL-MCNC: 0.5 MG/DL
BUN SERPL-MCNC: 17.7 MG/DL (ref 6–20)
CALCIUM SERPL-MCNC: 9.6 MG/DL (ref 8.8–10.4)
CHLORIDE SERPL-SCNC: 103 MMOL/L (ref 98–107)
CHOLEST SERPL-MCNC: 164 MG/DL
CREAT SERPL-MCNC: 0.89 MG/DL (ref 0.67–1.17)
EGFRCR SERPLBLD CKD-EPI 2021: >90 ML/MIN/1.73M2
ERYTHROCYTE [DISTWIDTH] IN BLOOD BY AUTOMATED COUNT: 11.8 % (ref 10–15)
FASTING STATUS PATIENT QL REPORTED: YES
FASTING STATUS PATIENT QL REPORTED: YES
GLUCOSE SERPL-MCNC: 97 MG/DL (ref 70–99)
HCO3 SERPL-SCNC: 28 MMOL/L (ref 22–29)
HCT VFR BLD AUTO: 44.4 % (ref 40–53)
HDLC SERPL-MCNC: 47 MG/DL
HGB BLD-MCNC: 14.8 G/DL (ref 13.3–17.7)
LDLC SERPL CALC-MCNC: 98 MG/DL
MCH RBC QN AUTO: 27.5 PG (ref 26.5–33)
MCHC RBC AUTO-ENTMCNC: 33.3 G/DL (ref 31.5–36.5)
MCV RBC AUTO: 83 FL (ref 78–100)
NONHDLC SERPL-MCNC: 117 MG/DL
PLATELET # BLD AUTO: 149 10E3/UL (ref 150–450)
POTASSIUM SERPL-SCNC: 4 MMOL/L (ref 3.4–5.3)
PROT SERPL-MCNC: 7.1 G/DL (ref 6.4–8.3)
RBC # BLD AUTO: 5.38 10E6/UL (ref 4.4–5.9)
SODIUM SERPL-SCNC: 139 MMOL/L (ref 135–145)
TRIGL SERPL-MCNC: 95 MG/DL
WBC # BLD AUTO: 4.8 10E3/UL (ref 4–11)

## 2024-11-25 PROCEDURE — 99395 PREV VISIT EST AGE 18-39: CPT | Performed by: PHYSICIAN ASSISTANT

## 2024-11-25 PROCEDURE — 85027 COMPLETE CBC AUTOMATED: CPT | Performed by: PHYSICIAN ASSISTANT

## 2024-11-25 PROCEDURE — 99213 OFFICE O/P EST LOW 20 MIN: CPT | Mod: 25 | Performed by: PHYSICIAN ASSISTANT

## 2024-11-25 PROCEDURE — 80061 LIPID PANEL: CPT | Performed by: PHYSICIAN ASSISTANT

## 2024-11-25 PROCEDURE — 36415 COLL VENOUS BLD VENIPUNCTURE: CPT | Performed by: PHYSICIAN ASSISTANT

## 2024-11-25 PROCEDURE — 80053 COMPREHEN METABOLIC PANEL: CPT | Performed by: PHYSICIAN ASSISTANT

## 2024-11-25 SDOH — HEALTH STABILITY: PHYSICAL HEALTH: ON AVERAGE, HOW MANY DAYS PER WEEK DO YOU ENGAGE IN MODERATE TO STRENUOUS EXERCISE (LIKE A BRISK WALK)?: 1 DAY

## 2024-11-25 ASSESSMENT — SOCIAL DETERMINANTS OF HEALTH (SDOH): HOW OFTEN DO YOU GET TOGETHER WITH FRIENDS OR RELATIVES?: ONCE A WEEK

## 2024-11-25 ASSESSMENT — PAIN SCALES - GENERAL: PAINLEVEL_OUTOF10: NO PAIN (0)

## 2024-11-25 NOTE — PATIENT INSTRUCTIONS
Patient Education   Preventive Care Advice   This is general advice given by our system to help you stay healthy. However, your care team may have specific advice just for you. Please talk to your care team about your preventive care needs.  Nutrition  Eat 5 or more servings of fruits and vegetables each day.  Try wheat bread, brown rice and whole grain pasta (instead of white bread, rice, and pasta).  Get enough calcium and vitamin D. Check the label on foods and aim for 100% of the RDA (recommended daily allowance).  Lifestyle  Exercise at least 150 minutes each week  (30 minutes a day, 5 days a week).  Do muscle strengthening activities 2 days a week. These help control your weight and prevent disease.  No smoking.  Wear sunscreen to prevent skin cancer.  Have a dental exam and cleaning every 6 months.  Yearly exams  See your health care team every year to talk about:  Any changes in your health.  Any medicines your care team has prescribed.  Preventive care, family planning, and ways to prevent chronic diseases.  Shots (vaccines)   HPV shots (up to age 26), if you've never had them before.  Hepatitis B shots (up to age 59), if you've never had them before.  COVID-19 shot: Get this shot when it's due.  Flu shot: Get a flu shot every year.  Tetanus shot: Get a tetanus shot every 10 years.  Pneumococcal, hepatitis A, and RSV shots: Ask your care team if you need these based on your risk.  Shingles shot (for age 50 and up)  General health tests  Diabetes screening:  Starting at age 35, Get screened for diabetes at least every 3 years.  If you are younger than age 35, ask your care team if you should be screened for diabetes.  Cholesterol test: At age 39, start having a cholesterol test every 5 years, or more often if advised.  Bone density scan (DEXA): At age 50, ask your care team if you should have this scan for osteoporosis (brittle bones).  Hepatitis C: Get tested at least once in your life.  STIs (sexually  transmitted infections)  Before age 24: Ask your care team if you should be screened for STIs.  After age 24: Get screened for STIs if you're at risk. You are at risk for STIs (including HIV) if:  You are sexually active with more than one person.  You don't use condoms every time.  You or a partner was diagnosed with a sexually transmitted infection.  If you are at risk for HIV, ask about PrEP medicine to prevent HIV.  Get tested for HIV at least once in your life, whether you are at risk for HIV or not.  Cancer screening tests  Cervical cancer screening: If you have a cervix, begin getting regular cervical cancer screening tests starting at age 21.  Breast cancer scan (mammogram): If you've ever had breasts, begin having regular mammograms starting at age 40. This is a scan to check for breast cancer.  Colon cancer screening: It is important to start screening for colon cancer at age 45.  Have a colonoscopy test every 10 years (or more often if you're at risk) Or, ask your provider about stool tests like a FIT test every year or Cologuard test every 3 years.  To learn more about your testing options, visit:   .  For help making a decision, visit:   https://bit.ly/ir19331.  Prostate cancer screening test: If you have a prostate, ask your care team if a prostate cancer screening test (PSA) at age 55 is right for you.  Lung cancer screening: If you are a current or former smoker ages 50 to 80, ask your care team if ongoing lung cancer screenings are right for you.  For informational purposes only. Not to replace the advice of your health care provider. Copyright   2023 [employer]. All rights reserved. Clinically reviewed by the [facility]. [employer] 016780 - REV 01/24.

## 2024-11-25 NOTE — RESULT ENCOUNTER NOTE
Lew,    I have reviewed your lab results below:    - electrolytes, blood sugar, kidney and liver function normal   - cholesterol looks great  - blood counts are normal    Please ignore any other labs that are flagged as high or low that I have not mentioned. These are normal variations and not a cause for concern. Please let me know if you have any further questions.    Take care,  Danyelle Engel PA-C on 11/25/2024 at 1:20 PM

## 2024-11-25 NOTE — PROGRESS NOTES
"Preventive Care Visit  [facility]  Danyelle Bardales PA-C, Physician Assistant - Medical  Nov 25, 2024    Assessment & Plan     Routine general medical examination at a health care facility  - Lipid panel reflex to direct LDL Fasting  - Comprehensive metabolic panel (BMP + Alb, Alk Phos, ALT, AST, Total. Bili, TP)  - CBC with platelets    Hypertriglyceridemia  History of. Normal last year. Recheck today  - Lipid panel reflex to direct LDL Fasting      BMI  Estimated body mass index is 25.97 kg/m  as calculated from the following:    Height as of this encounter: 1.802 m (5' 10.95\").    Weight as of this encounter: 84.3 kg (185 lb 14.4 oz).   Weight management plan: Discussed healthy diet and exercise guidelines    Counseling  Appropriate preventive services were addressed with this patient via screening, questionnaire, or discussion as appropriate for fall prevention, nutrition, physical activity, Tobacco-use cessation, social engagement, weight loss and cognition.  Checklist reviewing preventive services available has been given to the patient.  Reviewed patient's diet, addressing concerns and/or questions.   He is at risk for lack of exercise and has been provided with information to increase physical activity for the benefit of his well-being.       Danyelle Engel PA-C on 11/25/2024 at 8:29 AM      Tashi Hackett is a 34 year old, presenting for the following:  Physical        11/25/2024     7:17 AM   Additional Questions   Roomed by Yumi HOLDEN          Healthy Habits:     Getting at least 3 servings of Calcium per day:  Yes    Bi-annual eye exam:  Yes    Dental care twice a year:  Yes    Sleep apnea or symptoms of sleep apnea:  None    Diet:  Regular (no restrictions)    Frequency of exercise:  1 day/week    Taking medications regularly:  Not Applicable    Barriers to taking medications:  Not applicable    Medication side effects:  Not applicable    Additional concerns today:  " No    No concerns  Declines COVID vaccine  No regular exercise     Health Care Directive  Patient does not have a Health Care Directive: Discussed advance care planning with patient; however, patient declined at this time.      11/25/2024   General Health   How would you rate your overall physical health? Good   Feel stress (tense, anxious, or unable to sleep) Not at all            11/25/2024   Nutrition   Three or more servings of calcium each day? Yes   Diet: Regular (no restrictions)   How many servings of fruit and vegetables per day? (!) 2-3   How many sweetened beverages each day? 0-1            11/25/2024   Exercise   Days per week of moderate/strenous exercise 1 day      (!) EXERCISE CONCERN      11/25/2024   Social Factors   Frequency of gathering with friends or relatives Once a week   Worry food won't last until get money to buy more No   Food not last or not have enough money for food? No   Do you have housing? (Housing is defined as stable permanent housing and does not include staying ouside in a car, in a tent, in an abandoned building, in an overnight shelter, or couch-surfing.) Yes   Are you worried about losing your housing? No   Lack of transportation? No   Unable to get utilities (heat,electricity)? No            11/25/2024   Dental   Dentist two times every year? Yes               Today's PHQ-2 Score:       11/25/2024     7:15 AM   PHQ-2 ( 1999 [employer])   Q1: Little interest or pleasure in doing things 0    Q2: Feeling down, depressed or hopeless 0    PHQ-2 Score 0    Q1: Little interest or pleasure in doing things Not at all   Q2: Feeling down, depressed or hopeless Not at all   PHQ-2 Score 0       Patient-reported           11/25/2024   Substance Use   Alcohol more than 3/day or more than 7/wk No   Do you use any other substances recreationally? No        Social History     Tobacco Use    Smoking status: Never    Smokeless tobacco: Never   Vaping Use    Vaping status: Never Used   Substance Use  "Topics    Alcohol use: Not Currently     Comment: once a month    Drug use: Never         11/25/2024   STI Screening   New sexual partner(s) since last STI/HIV test? No            11/25/2024   Contraception/Family Planning   Questions about contraception or family planning No        Reviewed and updated as needed this visit by Provider   Tobacco  Allergies  Meds  Problems  Med Hx  Surg Hx  Fam Hx            Past Medical History:   Diagnosis Date    Sensorineural hearing loss     Sleep apnea      Past Surgical History:   Procedure Laterality Date    APPENDECTOMY           Review of Systems  Constitutional, HEENT, cardiovascular, pulmonary, GI, , musculoskeletal, neuro, skin, endocrine and psych systems are negative, except as otherwise noted.     Objective    Exam  BP 96/70   Pulse 58   Temp 97.6  F (36.4  C)   Resp 16   Ht 1.802 m (5' 10.95\")   Wt 84.3 kg (185 lb 14.4 oz)   SpO2 98%   BMI 25.97 kg/m     Estimated body mass index is 25.97 kg/m  as calculated from the following:    Height as of this encounter: 1.802 m (5' 10.95\").    Weight as of this encounter: 84.3 kg (185 lb 14.4 oz).    Physical Exam  GENERAL: alert and no distress  EYES: Eyes grossly normal to inspection, PERRL and conjunctivae and sclerae normal  HENT: ear canals and TM's normal, nose and mouth without ulcers or lesions  NECK: no adenopathy, no asymmetry, masses, or scars  RESP: lungs clear to auscultation - no rales, rhonchi or wheezes  CV: regular rate and rhythm, normal S1 S2, no S3 or S4, no murmur, click or rub, no peripheral edema  ABDOMEN: soft, nontender, no hepatosplenomegaly, no masses and bowel sounds normal  MS: no gross musculoskeletal defects noted, no edema  SKIN: no suspicious lesions or rashes on exposed skin  NEURO: Normal strength and tone, mentation intact and speech normal  PSYCH: mentation appears normal, affect normal/bright      Signed Electronically by:Danyelle Engel PA-C on 11/25/2024 at 8:30 AM  "

## 2025-04-15 ENCOUNTER — NURSE TRIAGE (OUTPATIENT)
Dept: NURSING | Facility: CLINIC | Age: 35
End: 2025-04-15
Payer: COMMERCIAL

## 2025-04-16 NOTE — TELEPHONE ENCOUNTER
"Patient calling reporting having an area of bleeding to the white of his eye. Reports rubbing the eye in the shower and seeing bleeding when he got out. Reports dryness sensation when he blinks with a burning sensation. Denies pain, swelling to the eyelid and fever. Advised to see a provider within 3 days with patient agreeable to the plan.     Casie Horner RN 04/15/25 9:48 PM   [facility] Triage Nurse Advisor    Reason for Disposition   Bleeding on white of the eye    Additional Information   Negative: Chemical got in the eye   Negative: Piece of something got in the eye   Negative: Eye redness followed an eye injury   Negative: Yellow or green pus in the eyes   Negative: [1] Eyelid is swollen AND [2] no redness of white of eye (sclera)   Negative: Caused by pollen or other allergy OR main symptom is itchy eyes   Negative: Red, widespread rash also present   Negative: SEVERE eye pain   Negative: [1] Eyelids are very swollen (shut or almost) AND [2] fever   Negative: [1] Eyelid (outer) is very red (or tender to touch) AND [2] fever   Negative: [1] Foreign body sensation (\"feels like something is in there\") AND [2] irrigation didn't help   Negative: Vomiting   Negative: [1] Recent eye surgery AND [2] increasing eye pain   Negative: Patient sounds very sick or weak to the triager   Negative: MODERATE eye pain or discomfort (e.g., interferes with normal activities or awakens from sleep; more than mild)   Negative: New or worsening blurred vision   Negative: Looking at light causes MODERATE to SEVERE eye pain (i.e., photophobia)   Negative: Cloudy spot or sore seen on the cornea (clear part of the eye)   Negative: Eyelid is very swollen (shut or almost)   Negative: Eyelid is red and painful (or tender to touch)   Negative: Eye pain present > 24 hours   Negative: [1] Bleeding on white of the eye AND [2] taking Coumadin (warfarin) or other strong blood thinner, or known bleeding disorder (e.g., " thrombocytopenia)    Protocols used: Eye - Red Without Pus-A-AH